# Patient Record
Sex: MALE | Race: AMERICAN INDIAN OR ALASKA NATIVE
[De-identification: names, ages, dates, MRNs, and addresses within clinical notes are randomized per-mention and may not be internally consistent; named-entity substitution may affect disease eponyms.]

---

## 2018-06-01 ENCOUNTER — HOSPITAL ENCOUNTER (INPATIENT)
Dept: HOSPITAL 31 - C.ER | Age: 38
LOS: 7 days | Discharge: HOME | DRG: 440 | End: 2018-06-08
Attending: INTERNAL MEDICINE | Admitting: INTERNAL MEDICINE
Payer: COMMERCIAL

## 2018-06-01 VITALS — BODY MASS INDEX: 33.5 KG/M2

## 2018-06-01 DIAGNOSIS — E87.6: ICD-10-CM

## 2018-06-01 DIAGNOSIS — E78.5: ICD-10-CM

## 2018-06-01 DIAGNOSIS — I10: ICD-10-CM

## 2018-06-01 DIAGNOSIS — K85.90: Primary | ICD-10-CM

## 2018-06-01 DIAGNOSIS — K70.10: ICD-10-CM

## 2018-06-01 DIAGNOSIS — K76.0: ICD-10-CM

## 2018-06-01 LAB
ALBUMIN SERPL-MCNC: 4.5 G/DL (ref 3.5–5)
ALBUMIN/GLOB SERPL: 1.1 {RATIO} (ref 1–2.1)
ALT SERPL-CCNC: 146 U/L (ref 21–72)
AMYLASE SERPL-CCNC: 436 U/L (ref 30–110)
AST SERPL-CCNC: 308 U/L (ref 17–59)
BACTERIA #/AREA URNS HPF: (no result) /[HPF]
BASOPHILS # BLD AUTO: 0 K/UL (ref 0–0.2)
BASOPHILS NFR BLD: 0.4 % (ref 0–2)
BILIRUB UR-MCNC: NEGATIVE MG/DL
BILIRUB UR-MCNC: NEGATIVE MG/DL
BUN SERPL-MCNC: 15 MG/DL (ref 9–20)
CALCIUM SERPL-MCNC: 9.2 MG/DL (ref 8.6–10.4)
COLOR UR: YELLOW
EOSINOPHIL # BLD AUTO: 0 K/UL (ref 0–0.7)
EOSINOPHIL NFR BLD: 0 % (ref 0–4)
ERYTHROCYTE [DISTWIDTH] IN BLOOD BY AUTOMATED COUNT: 13.2 % (ref 11.5–14.5)
GFR NON-AFRICAN AMERICAN: > 60
GLUCOSE UR STRIP-MCNC: NORMAL MG/DL
GLUCOSE UR STRIP-MCNC: NORMAL MG/DL
HGB BLD-MCNC: 16.1 G/DL (ref 12–18)
HYALINE CASTS #/AREA URNS LPF: (no result) /LPF (ref 0–2)
LEUKOCYTE ESTERASE UR-ACNC: (no result) LEU/UL
LEUKOCYTE ESTERASE UR-ACNC: (no result) LEU/UL
LIPASE: 3257 U/L (ref 23–300)
LYMPHOCYTES # BLD AUTO: 1.7 K/UL (ref 1–4.3)
LYMPHOCYTES NFR BLD AUTO: 14.8 % (ref 20–40)
MCH RBC QN AUTO: 31.4 PG (ref 27–31)
MCHC RBC AUTO-ENTMCNC: 35 G/DL (ref 33–37)
MCV RBC AUTO: 89.8 FL (ref 80–94)
MONOCYTES # BLD: 0.7 K/UL (ref 0–0.8)
MONOCYTES NFR BLD: 5.9 % (ref 0–10)
NEUTROPHILS # BLD: 8.8 K/UL (ref 1.8–7)
NEUTROPHILS NFR BLD AUTO: 78.9 % (ref 50–75)
NRBC BLD AUTO-RTO: 0 % (ref 0–2)
PH UR STRIP: 5 [PH] (ref 5–8)
PH UR STRIP: 5 [PH] (ref 5–8)
PLATELET # BLD: 269 K/UL (ref 130–400)
PMV BLD AUTO: 8.4 FL (ref 7.2–11.7)
PROT UR STRIP-MCNC: (no result) MG/DL
PROT UR STRIP-MCNC: (no result) MG/DL
RBC # BLD AUTO: 5.13 MIL/UL (ref 4.4–5.9)
RBC # UR STRIP: (no result) /UL
RBC # UR STRIP: (no result) /UL
SP GR UR STRIP: 1.03 (ref 1–1.03)
SP GR UR STRIP: 1.03 (ref 1–1.03)
SQUAMOUS EPITHIAL: 1 /HPF (ref 0–5)
SQUAMOUS EPITHIAL: 1 /HPF (ref 0–5)
UROBILINOGEN UR-MCNC: NORMAL MG/DL (ref 0.2–1)
UROBILINOGEN UR-MCNC: NORMAL MG/DL (ref 0.2–1)
WBC # BLD AUTO: 11.2 K/UL (ref 4.8–10.8)

## 2018-06-01 RX ADMIN — HYDROMORPHONE HYDROCHLORIDE PRN MG: 1 INJECTION, SOLUTION INTRAMUSCULAR; INTRAVENOUS; SUBCUTANEOUS at 17:18

## 2018-06-01 RX ADMIN — HYDROMORPHONE HYDROCHLORIDE PRN MG: 1 INJECTION, SOLUTION INTRAMUSCULAR; INTRAVENOUS; SUBCUTANEOUS at 23:54

## 2018-06-01 RX ADMIN — DEXTROSE AND SODIUM CHLORIDE SCH MLS/HR: 5; 450 INJECTION, SOLUTION INTRAVENOUS at 18:47

## 2018-06-01 RX ADMIN — DEXTROSE AND SODIUM CHLORIDE SCH MLS/HR: 5; 450 INJECTION, SOLUTION INTRAVENOUS at 21:28

## 2018-06-01 NOTE — CT
PROCEDURE:  CT Abdomen and Pelvis without intravenous contrast



HISTORY:

pancreatitis/abdominal pain



COMPARISON:

None.



TECHNIQUE:

Without contrast.. 



Contrast dose: 0



Radiation dose:



Total exam DLP = 777.27 mGy-cm.



This CT exam was performed using one or more of the following dose 

reduction techniques: Automated exposure control, adjustment of the 

mA and/or kV according to patient size, and/or use of iterative 

reconstruction technique.



FINDINGS:



LOWER THORAX:

Unremarkable. 



LIVER:

Hepatomegaly.  The liver measures approximately 20.5 cm craniocaudal. 

 Diffusely diminished attenuation of the liver with some 

heterogeneity consistent with heterogeneous fatty infiltration. No 

mass.  Smooth contour. No biliary ductal dilatation.  



GALLBLADDER AND BILE DUCTS:

Unremarkable. 



PANCREAS:

Extensive peripancreatic stranding about the body and tail of the 

pancreas without discrete fluid collection.  Consistent with acute 

pancreatitis.  No pancreatic mass or pancreatic ductal dilatation 

appreciated.



SPLEEN:

Normal size.  Punctate calcifications consistent with calcified old 

granulomas.  No mass. 



ADRENALS:

Unremarkable. No mass. 



KIDNEYS AND URETERS:

Unremarkable. No hydronephrosis. No solid mass. 



VASCULATURE:

Unremarkable. No aortic aneurysm. 



BOWEL:

Unremarkable. No obstruction. No gross mural thickening. 



APPENDIX:

Unremarkable. Normal appendix. 



PERITONEUM:

Fluid in left pericolic gutter as a result of acute pancreatitis. 



LYMPH NODES:

Unremarkable. No enlarged lymph nodes. 



BLADDER:

Unremarkable. 



REPRODUCTIVE:

Normal prostate 



BONES:

No acute fracture. 



OTHER FINDINGS:

None.



IMPRESSION:

Findings consistent with acute pancreatitis.  No discrete fluid 

collection. Hepatomegaly with fatty infiltration of the liver. No 

evidence of biliary obstruction or pancreatic ductal dilatation. No 

pancreatic mass identified on this noncontrast examination.

## 2018-06-01 NOTE — CP.PCM.CON
History of Present Illness





- History of Present Illness


History of Present Illness: 





This is a 38 year old man with abdominal pain.





Patient was previously admitted to  from 12/23/2014 to 12/29/2014 for 

pancreatitis attributed to alcohol ingestion.  Patient states that he continued 

to drink beer until last weekend when he consumed close to one pint of bourbon.

  He noted sudden onset of epigastric and LUQ pain on the night before 

admission.  The pain was described as sharp, needle-like, constant, and 

accompanied by nausea and vomiting of bilious material (eight to ten times).  

He has not had a bowel movement in the past two days, but he denies having 

diarrhea, constipation or rectal bleeding before this illness.  He also denies 

having loss of appetite, loss of weight, heartburn or difficulty swallowing.





On evaluation in the ER, elevated liver enzymes and lipase were noted.  The AST 

was 308, , ALKP 117 and lipase 3250.  CT scan showed fatty liver and 

extensive peripancreatic stranding; sonogram showed no evidence of 

cholelithiasis and normal sized bile ducts.





Review of Systems





- Review of Systems


All systems: reviewed and no additional remarkable complaints except





- Constitutional


Constitutional: absent: Chills, Fever





- Cardiovascular


Cardiovascular: absent: Chest Pain, Palpitations





- Respiratory


Respiratory: absent: Dyspnea





- Gastrointestinal


Gastrointestinal: Abdominal Pain, Constipation, Nausea, Vomiting.  absent: 

Diarrhea, Dysphagia, Heartburn, Hematochezia





- Musculoskeletal


Musculoskeletal: absent: Back Pain





- Neurological


Neurological: absent: Numbness, Weakness





Past Patient History





- Past Medical History & Family History


Past Medical History?: Yes





- Past Social History


Smoking Status: Never Smoked





- CARDIAC


Hx Hypercholesterolemia: Yes


Hx Hypertension: Yes (noncompliant with meds)





- MUSCULOSKELETAL/RHEUMATOLOGICAL


Hx Falls: No





- PSYCHIATRIC


Hx Substance Use: No





- SURGICAL HISTORY


Hx Surgeries: No





- ANESTHESIA


Hx Anesthesia: No


Hx Anesthesia Reactions: No


Hx Malignant Hyperthermia: No





Meds


Allergies/Adverse Reactions: 


 Allergies











Allergy/AdvReac Type Severity Reaction Status Date / Time


 


shellfish derived Allergy   Verified 06/01/18 10:52














- Medications


Medications: 


 Current Medications





Hydromorphone HCl (Dilaudid)  0.5 mg IVP Q6H PRN


   PRN Reason: Pain, severe (8-10)


   Last Admin: 06/01/18 17:18 Dose:  0.5 mg


Dextrose/Sodium Chloride (Dextrose 5%/0.45% Ns 1000 Ml)  1,000 mls @ 125 mls/hr 

IV .Q8H GABI


   Last Admin: 06/01/18 17:15 Dose:  125 mls/hr


Nitroglycerin (Nitro-Bid 2% Oint)  1 ea TOP ONCE ONE


   Stop: 06/02/18 18:08











Physical Exam





- Constitutional


Appears: In Acute Distress





- Head Exam


Head Exam: ATRAUMATIC, NORMOCEPHALIC





- Eye Exam


Eye Exam: EOMI, PERRL





- Neck Exam


Neck exam: Negative for: Lymphadenopathy, Thyromegaly





- Respiratory Exam


Respiratory Exam: NORMAL BREATHING PATTERN.  absent: Rales, Rhonchi, Wheezes





- Cardiovascular Exam


Cardiovascular Exam: REGULAR RHYTHM, +S1, +S2.  absent: Gallop, Rubs, Systolic 

Murmur





- GI/Abdominal Exam


GI & Abdominal Exam: Firm, Guarding, Normal Bowel Sounds, Tenderness.  absent: 

Mass, Organomegaly


Additional comments: 





Tenderness to palpation in the epigastric area





- Rectal Exam


Rectal Exam: Deferred





- Extremities Exam


Extremities exam: Negative for: calf tenderness, pedal edema





Results





- Vital Signs


Recent Vital Signs: 


 Last Vital Signs











Temp  98.8 F   06/01/18 10:54


 


Pulse  102 H  06/01/18 14:52


 


Resp  16   06/01/18 14:52


 


BP  138/102 H  06/01/18 14:52


 


Pulse Ox  99   06/01/18 14:52














- Labs


Result Diagrams: 


 06/01/18 11:45





 06/01/18 11:45


Labs: 


 Laboratory Results - last 24 hr











  06/01/18 06/01/18 06/01/18





  11:45 11:45 11:45


 


WBC  11.2 H D  


 


RBC  5.13  


 


Hgb  16.1  


 


Hct  46.0  


 


MCV  89.8  D  


 


MCH  31.4 H  


 


MCHC  35.0  


 


RDW  13.2  


 


Plt Count  269  


 


MPV  8.4  


 


Neut % (Auto)  78.9 H  


 


Lymph % (Auto)  14.8 L  


 


Mono % (Auto)  5.9  


 


Eos % (Auto)  0.0  


 


Baso % (Auto)  0.4  


 


Neut # (Auto)  8.8 H  


 


Lymph # (Auto)  1.7  


 


Mono # (Auto)  0.7  


 


Eos # (Auto)  0.0  


 


Baso # (Auto)  0.0  


 


Sodium    140


 


Potassium    4.1


 


Chloride    100


 


Carbon Dioxide    19 L


 


Anion Gap    25 H


 


BUN    15


 


Creatinine    1.1


 


Est GFR ( Amer)    > 60


 


Est GFR (Non-Af Amer)    > 60


 


Random Glucose    95


 


Calcium    9.2


 


Total Bilirubin    1.3


 


AST    308 H


 


ALT    146 H


 


Alkaline Phosphatase    117


 


Total Protein    8.5 H


 


Albumin    4.5


 


Globulin    4.0 H


 


Albumin/Globulin Ratio    1.1


 


Amylase    436 H


 


Lipase    3257 H


 


Urine Color   Klarissa 


 


Urine Clarity   Hazy 


 


Urine pH   5.0 


 


Ur Specific Gravity   1.027 


 


Urine Protein   2+ H 


 


Urine Glucose (UA)   Normal 


 


Urine Ketones   1+ H 


 


Urine Blood   2+ H 


 


Urine Nitrate   Negative 


 


Urine Bilirubin   Negative 


 


Urine Urobilinogen   Normal 


 


Ur Leukocyte Esterase   Neg 


 


Urine WBC (Auto)   2 


 


Urine RBC (Auto)   4 H 


 


Ur Squamous Epith Cells   1 


 


Urine Bacteria   


 


Hyaline Casts   6-10 H 














  06/01/18





  16:43


 


WBC 


 


RBC 


 


Hgb 


 


Hct 


 


MCV 


 


MCH 


 


MCHC 


 


RDW 


 


Plt Count 


 


MPV 


 


Neut % (Auto) 


 


Lymph % (Auto) 


 


Mono % (Auto) 


 


Eos % (Auto) 


 


Baso % (Auto) 


 


Neut # (Auto) 


 


Lymph # (Auto) 


 


Mono # (Auto) 


 


Eos # (Auto) 


 


Baso # (Auto) 


 


Sodium 


 


Potassium 


 


Chloride 


 


Carbon Dioxide 


 


Anion Gap 


 


BUN 


 


Creatinine 


 


Est GFR ( Amer) 


 


Est GFR (Non-Af Amer) 


 


Random Glucose 


 


Calcium 


 


Total Bilirubin 


 


AST 


 


ALT 


 


Alkaline Phosphatase 


 


Total Protein 


 


Albumin 


 


Globulin 


 


Albumin/Globulin Ratio 


 


Amylase 


 


Lipase 


 


Urine Color  Yellow


 


Urine Clarity  Hazy


 


Urine pH  5.0


 


Ur Specific Gravity  1.028


 


Urine Protein  2+ H


 


Urine Glucose (UA)  Normal


 


Urine Ketones  1+ H


 


Urine Blood  1+ H


 


Urine Nitrate  Negative


 


Urine Bilirubin  Negative


 


Urine Urobilinogen  Normal


 


Ur Leukocyte Esterase  Neg


 


Urine WBC (Auto)  3


 


Urine RBC (Auto)  12 H


 


Ur Squamous Epith Cells  1


 


Urine Bacteria  Few H


 


Hyaline Casts 














Assessment & Plan


(1) Alcoholic hepatitis


Assessment and Plan: 


Elevated transaminases with AST>ALT and imaging findings of fatty liver are 

consistent with alcoholic hepatitis.  Recommend abstinence from alcohol and 

referral to an alcohol treatment program.


Status: Acute   





(2) Acute pancreatitis


Assessment and Plan: 


Patient has acute pancreatitis most likely related to alcohol abuse.  In 

addition, there is evidence of significant hemoconcentration (HGB 16.1).  

Recommend vigorous hydration and NPO for now.


Status: Acute

## 2018-06-01 NOTE — US
HISTORY:

abdominal pain/vomiting



COMPARISON:

None.



TECHNIQUE:

Grayscale imaging was performed.



FINDINGS:



LIVER:

Measures 20.4 cm in length. There is diffuse increased echogenicity 

of the liver parenchyma.  No mass. No intrahepatic bile duct 

dilatation. 



GALLBLADDER:

There are no gallstones, wall thickening or pericholecystic fluid.  

The sonographic Altamirano's sign is negative. 



COMMON BILE DUCT:

Measures 2.3 mm.  No stones. No dilatation.



PANCREAS:

Unremarkable as visualized. No mass. No ductal dilatation.



RIGHT KIDNEY:

Measures 12.3 cm in length. Normal echogenicity. No calculus, mass, 

or hydronephrosis.



AORTA:

No aneurysmal dilatation.



IVC:

Unremarkable.



OTHER FINDINGS:

None .



IMPRESSION:

Mild hepatomegaly.  Diffuse increased echogenicity in the liver may 

reflect hepatic steatosis however parenchymal infectious/ 

inflammatory etiologies cannot be entirely excluded.  Clinical and 

laboratory correlation is advised.



No cholelithiasis or biliary dilatation.

## 2018-06-01 NOTE — C.PDOC
History Of Present Illness


38-year-old male, presents to the emergency department with complaints of 

epigastric and right upper quadrant abdominal pain that started last night. 

Patient notes nausea, that is associated with several episodes of non-bloody/non

-bilious vomiting. He denies any fevers, chills, diarrhea, or any other 

associated symptoms. No other complaints at this time.


Time Seen by Provider: 06/01/18 11:09


Chief Complaint (Nursing): Abdominal Pain


History Per: Patient


History/Exam Limitations: no limitations


Onset/Duration Of Symptoms: Hrs


Current Symptoms Are (Timing): Still Present


Severity: Moderate


Location Of Pain/Discomfort: RUQ, Epigastric


Radiation Of Pain To:: None


Associated Symptoms: Nausea, Vomiting





Past Medical History


Reviewed: Historical Data, Nursing Documentation, Vital Signs


Vital Signs: 


 Last Vital Signs











Temp  98.8 F   06/01/18 10:54


 


Pulse  107 H  06/01/18 13:31


 


Resp  16   06/01/18 13:31


 


BP  165/110 H  06/01/18 13:31


 


Pulse Ox  98   06/01/18 13:31














- Medical History


PMH: HTN (noncompliant with meds), Hypercholesterolemia





- [facility] Procedures








INFLUENZA VACCINATION (12/23/14)


VACCINATION NEC (12/23/14)








Family History: States: No Known Family Hx





- Social History


Hx Tobacco Use: No


Hx Alcohol Use: Yes


Hx Substance Use: No





- Immunization History


Hx Tetanus Toxoid Vaccination: Yes


Hx Influenza Vaccination: Yes


Hx Pneumococcal Vaccination: No





Review Of Systems


Constitutional: Negative for: Fever, Chills


Cardiovascular: Negative for: Chest Pain, Palpitations


Respiratory: Negative for: Shortness of Breath


Gastrointestinal: Positive for: Nausea, Vomiting, Abdominal Pain.  Negative for

: Diarrhea, Constipation, Hematochezia, Hematemesis


Musculoskeletal: Negative for: Back Pain


Neurological: Negative for: Weakness, Numbness





Physical Exam





- Physical Exam


Appears: Non-toxic, Other (moderate distress)


Skin: Normal Color, Warm, Dry, No Rash


Head: Atraumatic, Normacephalic


Eye(s): bilateral: Normal Inspection, PERRL


Nose: Normal


Oral Mucosa: Moist


Lips: Normal Appearing


Neck: Normal ROM


Chest: Symmetrical


Cardiovascular: Rhythm Regular, No Murmur


Respiratory: Normal Breath Sounds, No Accessory Muscle Use


Gastrointestinal/Abdominal: Soft, Tenderness (epigastric, RUQ (+)todd's sign)

, No Guarding, No Rebound


Extremity: Normal ROM, No Deformity, No Swelling


Neurological/Psych: Oriented x3, Normal Speech





ED Course And Treatment





- Laboratory Results


Result Diagrams: 


 06/01/18 11:45





 06/01/18 11:45


O2 Sat by Pulse Oximetry: 95 (RA)


Pulse Ox Interpretation: Normal


Progress Note: Case was d/w  who accepted patient to telemetry ( 

due to patient's condition).





Medical Decision Making


Medical Decision Making: 


Impression


abdominal pain





Plan:


* Bloodwork


* Protonix, IVFs, Toradol, Zofran


* US Abdomen


* UA


* Reassess and Disposition





Disposition





- Disposition


Disposition: HOSPITALIZED


Disposition Time: 14:26


Condition: FAIR


Forms:  [facility] (English)





- Clinical Impression


Clinical Impression: 


 Acute pancreatitis








- Scribe Statement


The provider has reviewed the documentation as recorded by the Scribe (Tabby Bowens)


All medical record entries made by the Scribe were at my direction and 

personally dictated by me. I have reviewed the chart and agree that the record 

accurately reflects my personal performance of the history, physical exam, 

medical decision making, and the department course for this patient. I have 

also personally directed, reviewed, and agree with the discharge instructions 

and disposition.





Decision To Admit





- Pt Status Changed To:


Hospital Disposition Of: Inpatient





- Admit Certification


Admit to Inpatient:: After my assessment, the patient will require 

hospitalization for at least two midnights.  This is because of the severity of 

symptoms shown, intensity of services needed, and/or the medical risk in this 

patient being treated as an outpatient.





- InPatient:


Physician Admission Certification:: Patient with acute pancreatitis needs to be 

NPO on IVF for more than 2 days.





- .


Bed Request Type: Telemetry


Admitting Physician: Zully Torres


Patient Diagnosis: 


 Acute pancreatitis

## 2018-06-01 NOTE — CP.PCM.PN
Subjective





- Date & Time of Evaluation


Date of Evaluation: 06/01/18


Time of Evaluation: 15:35





- Subjective


Subjective: 





H&P dictated #[id number]





Objective





- Vital Signs/Intake and Output


Vital Signs (last 24 hours): 


 











Temp Pulse Resp BP Pulse Ox


 


 98.8 F   102 H  16   138/102 H  99 


 


 06/01/18 10:54  06/01/18 14:52  06/01/18 14:52  06/01/18 14:52  06/01/18 14:52











- Medications


Medications: 


 Current Medications





Hydromorphone HCl (Dilaudid)  0.5 mg IVP Q6H PRN


   PRN Reason: Pain, severe (8-10)


Dextrose/Sodium Chloride (Dextrose 5%/0.45% Ns 1000 Ml)  1,000 mls @ 125 mls/hr 

IV .Q8H GABI


Thiamine HCl (Vitamin B1 Inj)  100 mg IV ONCE ONE


   Stop: 06/01/18 15:31











- Labs


Labs: 


 





 06/01/18 11:45 





 06/01/18 11:45

## 2018-06-02 LAB
ALBUMIN SERPL-MCNC: 3.6 G/DL (ref 3.5–5)
ALBUMIN/GLOB SERPL: 1.1 {RATIO} (ref 1–2.1)
ALT SERPL-CCNC: 103 U/L (ref 21–72)
AMYLASE SERPL-CCNC: 289 U/L (ref 30–110)
AST SERPL-CCNC: 195 U/L (ref 17–59)
BASOPHILS # BLD AUTO: 0 K/UL (ref 0–0.2)
BASOPHILS NFR BLD: 0.5 % (ref 0–2)
BUN SERPL-MCNC: 6 MG/DL (ref 9–20)
CALCIUM SERPL-MCNC: 8.6 MG/DL (ref 8.6–10.4)
EOSINOPHIL # BLD AUTO: 0.2 K/UL (ref 0–0.7)
EOSINOPHIL NFR BLD: 2.4 % (ref 0–4)
ERYTHROCYTE [DISTWIDTH] IN BLOOD BY AUTOMATED COUNT: 13.3 % (ref 11.5–14.5)
GFR NON-AFRICAN AMERICAN: > 60
HDLC SERPL-MCNC: 33 MG/DL (ref 30–70)
HGB BLD-MCNC: 13.7 G/DL (ref 12–18)
LDLC SERPL-MCNC: 53 MG/DL (ref 0–129)
LIPASE: 2787 U/L (ref 23–300)
LYMPHOCYTES # BLD AUTO: 1 K/UL (ref 1–4.3)
LYMPHOCYTES NFR BLD AUTO: 14.3 % (ref 20–40)
MCH RBC QN AUTO: 31.3 PG (ref 27–31)
MCHC RBC AUTO-ENTMCNC: 34.9 G/DL (ref 33–37)
MCV RBC AUTO: 89.8 FL (ref 80–94)
MONOCYTES # BLD: 0.7 K/UL (ref 0–0.8)
MONOCYTES NFR BLD: 9.5 % (ref 0–10)
NEUTROPHILS # BLD: 5.2 K/UL (ref 1.8–7)
NEUTROPHILS NFR BLD AUTO: 73.3 % (ref 50–75)
NRBC BLD AUTO-RTO: 0.1 % (ref 0–2)
PLATELET # BLD: 188 K/UL (ref 130–400)
PMV BLD AUTO: 8 FL (ref 7.2–11.7)
RBC # BLD AUTO: 4.36 MIL/UL (ref 4.4–5.9)
WBC # BLD AUTO: 7 K/UL (ref 4.8–10.8)

## 2018-06-02 RX ADMIN — HYDROMORPHONE HYDROCHLORIDE PRN MG: 1 INJECTION, SOLUTION INTRAMUSCULAR; INTRAVENOUS; SUBCUTANEOUS at 06:26

## 2018-06-02 RX ADMIN — DEXTROSE AND SODIUM CHLORIDE SCH MLS/HR: 5; 450 INJECTION, SOLUTION INTRAVENOUS at 08:48

## 2018-06-02 RX ADMIN — SODIUM CHLORIDE, SODIUM LACTATE, POTASSIUM CHLORIDE, CALCIUM CHLORIDE AND DEXTROSE MONOHYDRATE SCH MLS/HR: 5; 600; 310; 30; 20 INJECTION, SOLUTION INTRAVENOUS at 14:02

## 2018-06-02 RX ADMIN — HYDROMORPHONE HYDROCHLORIDE PRN MG: 1 INJECTION, SOLUTION INTRAMUSCULAR; INTRAVENOUS; SUBCUTANEOUS at 20:19

## 2018-06-02 RX ADMIN — SODIUM CHLORIDE, SODIUM LACTATE, POTASSIUM CHLORIDE, CALCIUM CHLORIDE AND DEXTROSE MONOHYDRATE SCH MLS/HR: 5; 600; 310; 30; 20 INJECTION, SOLUTION INTRAVENOUS at 20:27

## 2018-06-02 RX ADMIN — HYDROMORPHONE HYDROCHLORIDE PRN MG: 1 INJECTION, SOLUTION INTRAMUSCULAR; INTRAVENOUS; SUBCUTANEOUS at 12:51

## 2018-06-02 RX ADMIN — DEXTROSE AND SODIUM CHLORIDE SCH MLS/HR: 5; 450 INJECTION, SOLUTION INTRAVENOUS at 04:21

## 2018-06-02 RX ADMIN — DEXTROSE AND SODIUM CHLORIDE SCH MLS/HR: 5; 450 INJECTION, SOLUTION INTRAVENOUS at 00:41

## 2018-06-02 NOTE — HP
CHIEF COMPLAINT:  Epigastric and upper abdominal pain which started last

night, associated with nausea and vomiting.



HISTORY OF PRESENT ILLNESS:  Mr. Dixon is a 38-year-old male with past

medical history of hypertension for many years, hyperlipemia who has been

following up with Dr. Marsh as primary care physician who had prior

admission in 12/2014, was admitted for alcohol-related acute pancreatitis

who has been drinking over the long weekend, drank multiple shots over the

weekend, and now he started noticing epigastric pain and upper abdominal

pain, which started last night.  It was sharp but nonradiating, 10/10,

associated with nausea and multiple episodes of vomiting.  He may have had

a bowel movement yesterday morning as per the patient.  His symptoms got

worse this morning, which made him come to the emergency room.  When I

examined the patient, he denied any headache or dizziness.  Denied any

nausea or vomiting.  His abdominal pain is better than yesterday, but still

persistent and better with the pain medication that he got in the emergency

room.  Denied any nausea or vomiting.  Denied any urinary complaints. 

Denied any diarrhea or constipation.  Denied any other neurologic symptoms.



PAST MEDICAL HISTORY:  Hypertension for more than 15 years, hyperlipemia.



FAMILY HISTORY:  Hypertension, diabetes mellitus and CAD in mother.  Sister

has coronary artery disease.



PAST SURGICAL HISTORY:  Denies any past surgical history.



PERSONAL HISTORY:  He is single, not having any children, works at a mental

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



ALLERGIES:  HE IS ALLERGIC TO SHELLFISH.



MEDICATIONS:  Include lisinopril 10 mg daily and Lipitor.



SOCIAL HISTORY:  He denies smoking.  He claims that he drinks alcohol on

the weekends.  Last drink was this past weekend.  Denies any other drug

abuse.



REVIEW OF SYSTEMS:  As described in history of present illness.  All other

systems reviewed and were found to be negative.



PHYSICAL EXAMINATION:

GENERAL:  Young, well-built, well-nourished male, lying in bed, in no acute

distress.

VITAL SIGNS:  Blood pressure 162/104, pulse 104, respiration 20,

temperature 98.6 degrees Fahrenheit, O2 saturation 96% on room air.

HEENT:  Pupils equal, round, and reacting to light and accommodation. 

Extraocular muscles intact.  No icterus.  No pallor.  No oral thrush.  No

pharyngeal congestion.  Dry mucous membranes.

NECK:  Supple.  No JVD.

LUNGS:  Bilateral with clear breath sounds.  No wheezing.  No rhonchi.

CVS:  S1 and S2 present, regular.

ABDOMEN:  Soft.  Bowel sounds present.  There is epigastric tenderness

noted.  No guarding, no rigidity, no rebound tenderness noted.

CNS:  Alert, awake, and oriented x3.  No focal deficits noted.

EXTREMITIES:  No edema.  Palpable peripheral pulses.



LABORATORY DATA:  Labs done from the ED, WBC 11.2, hemoglobin 16.1,

hematocrit 46 and platelets 269.  Sodium 140, potassium 4.1, chloride 100,

bicarb 19, BUN 15, creatinine 1.1, glucose 95, calcium 9.2.  Total

bilirubin 1.3, , , alkaline phosphate 117, total protein 8.5,

albumin 4.5, amylase 436, lipase 3257.  UA:  Specific gravity 1.027, pH of

5, protein 2+, ketones 1+, blood 2+, rbc's 4, hyaline cast _____.  CT of

the abdomen and pelvis consistent with acute pancreatitis and fatty

infiltration of the liver.  No evidence of biliary obstruction or

pancreatic ductal dilatation.  No pancreatic mass identified.  Ultrasound

of the abdomen consistent with mild hepatomegaly.  No cholelithiasis or

biliary dilatation.



ASSESSMENT AND PLAN:  Young male with past medical history of hypertension,

hyperlipemia, history of ethanol abuse and history of pancreatitis in 2014,

came into the emergency department with complaints of upper abdominal pain

associated with nausea and vomiting.  In the emergency department, the

patient was found to be having elevated liver function tests and elevated

lipase and CTS consistent with acute pancreatitis.  The patient is being

admitted for further management.

1.  Acute pancreatitis, probably alcohol related pancreatitis as the

ultrasound is negative for any cholelithiasis or any biliary dilatation.

2.  Abnormal liver function tests, probably secondary to acute alcoholic

hepatitis.

3.  History of hypertension, now blood pressure is uncontrolled.

4.  History of hyperlipidemia.



PLAN:  The patient is being admitted to telemetry.  We will keep the

patient n.p.o.  We will give IV hydration.  Give pain medication.  Dilaudid

as needed.  Repeat labs in the morning.  Obtain GI evaluation.  Restart his

home medications.  Monitor liver function tests.  I gave thiamine 100 mg IV

one dose and switched to p.o. medication once he has been set.  Repeat labs

in the morning.  We will give hydralazine as needed for elevated blood

pressure.





__________________________________________

Zully Torres MD





DD:  06/01/2018 21:20:19

DT:  06/02/2018 0:10:25

[facility] # [id number]

## 2018-06-02 NOTE — PN
DATE:  06/02/2018



SUBJECTIVE:  The patient was seen and examined at bedside.  The patient is

slightly feeling better.  Pain is less severe than yesterday.  Feeling

nauseous from the pain medication.  Denies any episodes of vomiting

overnight.  No other events from overnight happened other than his blood

pressure running high, requiring multiple IV doses.



REVIEW OF SYSTEMS:  All other systems reviewed and was found to be

negative.



PHYSICAL EXAMINATION:

GENERAL:  Young male lying in bed, in no acute distress.

VITAL SIGNS:  Blood pressure 139/90, pulse 90, respirations 20, temperature

98.6 degrees Fahrenheit, O2 saturations 96% on room air.

HEENT:  Pupils equal, round, and reacting to light and accommodation. 

Extraocular muscles intact.  No icterus.  No pallor.  No oral thrush.  No

pharyngeal congestion.

NECK:  Supple.  No JVD.

LUNGS:  Bilateral vesicular breath sounds.  No wheezing, no rhonchi.

CVS:  S1 and S2 are present, regular.

ABDOMEN:  Epigastric tenderness present.  Soft.  Bowel sounds present.  No

guarding.  No rigidity.

CNS:  Alert, awake, and oriented x3.  No focal deficits noted.

EXTREMITIES:  No edema.  Positive peripheral pulses.



MEDICATIONS:  Include Dilaudid 0.5 mg IV every 6 hours p.r.n., hydralazine

10 mg IV push every 6 hours p.r.n., IV fluids, D5 Ringer's lactate at 150

mL/hour.



LABORATORY DATA:  Labs done from this morning:  WBC 7, hemoglobin 13.7,

hematocrit 39.2, platelets 188.  Sodium 137, potassium 3.4, chloride 99,

bicarb 28, BUN 6, creatinine 0.9, glucose 136, calcium 8.6.  , ALT

103, alkaline phosphatase 68, albumin 6.7, albumin 3.6, triglycerides 212,

cholesterol 128, LDL 53, amylase 289, lipase _____, TSH 4.14.



ASSESSMENT AND PLAN:  A young male with history of hypertension,

hyperlipidemia, history of ethanol abuse, and prior admission for acute

pancreatitis, alcohol related.  Admitted for acute pancreatitis, most

likely alcohol-related and acute alcoholic hepatitis.  Thick elevated liver

function tests.  Hypokalemia.  The patient liver function tests are

improving and also the patient's amylase and lipase are better than

yesterday.  We will continue with current pain medication. 

Gastroenterology consult and follow up as needed.  IV fluids decreased to

150 mL.  We will continue with hydration.



Repeat labs in the morning.  We will add further recommendations.





__________________________________________

Zully Torres MD







DD:  06/02/2018 12:58:00

DT:  06/02/2018 14:37:55

Job # [id number]

## 2018-06-02 NOTE — CP.PCM.PN
Subjective





- Date & Time of Evaluation


Date of Evaluation: 06/02/18


Time of Evaluation: 10:52





- Subjective


Subjective: 





Patient states that the pain is slightly less severe, though he still requires 

parenteral narcotics every six hours.  He has been nauseated but denies having 

vomiting.  He had a small, formed bowel movement.





Objective





- Vital Signs/Intake and Output


Vital Signs (last 24 hours): 


 











Temp Pulse Resp BP Pulse Ox


 


 98.6 F   90   20   139/90   96 


 


 06/02/18 07:46  06/02/18 07:46  06/02/18 07:46  06/02/18 07:46  06/02/18 07:46








Intake and Output: 


 











 06/02/18 06/02/18





 06:59 18:59


 


Intake Total 3550 


 


Output Total 200 


 


Balance 3350 














- Medications


Medications: 


 Current Medications





Hydralazine HCl (Apresoline)  10 mg IVP Q6H PRN


   PRN Reason: For BP>180


   Last Admin: 06/02/18 08:46 Dose:  10 mg


Hydromorphone HCl (Dilaudid)  0.5 mg IVP Q6H PRN


   PRN Reason: Pain, severe (8-10)


   Last Admin: 06/02/18 06:26 Dose:  0.5 mg


Dextrose/Lactated Ringer's (Dextrose 5%/Lactated Ringer's)  1,000 mls @ 150 mls/

hr IV .Q6H40M [facility]











- Labs


Labs: 


 





 06/02/18 06:36 





 06/02/18 06:36 











- Constitutional


Appears: No Acute Distress





- Head Exam


Head Exam: ATRAUMATIC, NORMOCEPHALIC





- Eye Exam


Eye Exam: EOMI, PERRL





- Neck Exam


Neck Exam: absent: Lymphadenopathy, Thyromegaly





- Respiratory Exam


Respiratory Exam: NORMAL BREATHING PATTERN.  absent: Rales, Rhonchi, Wheezes





- Cardiovascular Exam


Cardiovascular Exam: REGULAR RHYTHM, +S1, +S2.  absent: Gallop, Rubs, Murmur





- GI/Abdominal Exam


GI & Abdominal Exam: Soft, Tenderness, Normal Bowel Sounds.  absent: Mass, 

Organomegaly





- Rectal Exam


Rectal Exam: Deferred





- Extremities Exam


Extremities Exam: absent: Calf Tenderness, Pedal Edema





Assessment and Plan


(1) Alcoholic hepatitis


Assessment & Plan: 


Transaminases have improved to ,  (from 308 and 146, respectively)

.  Will follow.


Status: Acute   





(2) Acute pancreatitis


Assessment & Plan: 


Pain has improved slightly.  The state of hydration is better, with BUN/Cr down 

to 6/0.9 and HCT down to 39,2% from 46%.  Will reduce IV fluid to 150 cc per 

hour.


Status: Acute

## 2018-06-02 NOTE — CP.PCM.PN
Subjective





- Date & Time of Evaluation


Date of Evaluation: 06/02/18


Time of Evaluation: 08:35





- Subjective


Subjective: 





Progress note dictated #[id number]





Objective





- Vital Signs/Intake and Output


Vital Signs (last 24 hours): 


 











Temp Pulse Resp BP Pulse Ox


 


 98.6 F   90   20   139/90   96 


 


 06/02/18 07:46  06/02/18 07:46  06/02/18 07:46  06/02/18 07:46  06/02/18 07:46








Intake and Output: 


 











 06/02/18 06/02/18





 06:59 18:59


 


Intake Total 3550 


 


Output Total 200 


 


Balance 3350 














- Medications


Medications: 


 Current Medications





Hydralazine HCl (Apresoline)  10 mg IVP Q6H PRN


   PRN Reason: For BP>180


Hydromorphone HCl (Dilaudid)  0.5 mg IVP Q6H PRN


   PRN Reason: Pain, severe (8-10)


   Last Admin: 06/02/18 06:26 Dose:  0.5 mg


Dextrose/Sodium Chloride (Dextrose 5%/0.45% Ns 1000 Ml)  1,000 mls @ 300 mls/hr 

IV .Q3H20M [facility]


   Stop: 06/02/18 10:00


   Last Admin: 06/02/18 04:21 Dose:  300 mls/hr











- Labs


Labs: 


 





 06/02/18 06:36 





 06/02/18 06:36

## 2018-06-03 LAB
ALBUMIN SERPL-MCNC: 3.4 G/DL (ref 3.5–5)
ALBUMIN/GLOB SERPL: 1.1 {RATIO} (ref 1–2.1)
ALT SERPL-CCNC: 80 U/L (ref 21–72)
AMYLASE SERPL-CCNC: 159 U/L (ref 30–110)
AST SERPL-CCNC: 127 U/L (ref 17–59)
BASOPHILS # BLD AUTO: 0.1 K/UL (ref 0–0.2)
BASOPHILS NFR BLD: 0.7 % (ref 0–2)
BUN SERPL-MCNC: < 2 MG/DL (ref 9–20)
CALCIUM SERPL-MCNC: 8.8 MG/DL (ref 8.6–10.4)
EOSINOPHIL # BLD AUTO: 0.6 K/UL (ref 0–0.7)
EOSINOPHIL NFR BLD: 8.6 % (ref 0–4)
ERYTHROCYTE [DISTWIDTH] IN BLOOD BY AUTOMATED COUNT: 13.3 % (ref 11.5–14.5)
GFR NON-AFRICAN AMERICAN: > 60
HGB BLD-MCNC: 12.8 G/DL (ref 12–18)
LIPASE: 1487 U/L (ref 23–300)
LYMPHOCYTES # BLD AUTO: 1.3 K/UL (ref 1–4.3)
LYMPHOCYTES NFR BLD AUTO: 18.5 % (ref 20–40)
MCH RBC QN AUTO: 32 PG (ref 27–31)
MCHC RBC AUTO-ENTMCNC: 35.4 G/DL (ref 33–37)
MCV RBC AUTO: 90.3 FL (ref 80–94)
MONOCYTES # BLD: 0.7 K/UL (ref 0–0.8)
MONOCYTES NFR BLD: 10.1 % (ref 0–10)
NEUTROPHILS # BLD: 4.4 K/UL (ref 1.8–7)
NEUTROPHILS NFR BLD AUTO: 62.1 % (ref 50–75)
NRBC BLD AUTO-RTO: 0 % (ref 0–2)
PLATELET # BLD: 163 K/UL (ref 130–400)
PMV BLD AUTO: 8.8 FL (ref 7.2–11.7)
RBC # BLD AUTO: 4.01 MIL/UL (ref 4.4–5.9)
WBC # BLD AUTO: 7.1 K/UL (ref 4.8–10.8)

## 2018-06-03 RX ADMIN — SODIUM CHLORIDE, SODIUM LACTATE, POTASSIUM CHLORIDE, CALCIUM CHLORIDE AND DEXTROSE MONOHYDRATE SCH: 5; 600; 310; 30; 20 INJECTION, SOLUTION INTRAVENOUS at 00:20

## 2018-06-03 RX ADMIN — SODIUM CHLORIDE, SODIUM LACTATE, POTASSIUM CHLORIDE, CALCIUM CHLORIDE AND DEXTROSE MONOHYDRATE SCH MLS/HR: 5; 600; 310; 30; 20 INJECTION, SOLUTION INTRAVENOUS at 19:08

## 2018-06-03 RX ADMIN — HYDROMORPHONE HYDROCHLORIDE PRN MG: 1 INJECTION, SOLUTION INTRAMUSCULAR; INTRAVENOUS; SUBCUTANEOUS at 02:16

## 2018-06-03 RX ADMIN — SODIUM CHLORIDE, SODIUM LACTATE, POTASSIUM CHLORIDE, CALCIUM CHLORIDE AND DEXTROSE MONOHYDRATE SCH MLS/HR: 5; 600; 310; 30; 20 INJECTION, SOLUTION INTRAVENOUS at 14:28

## 2018-06-03 RX ADMIN — SODIUM CHLORIDE, SODIUM LACTATE, POTASSIUM CHLORIDE, CALCIUM CHLORIDE AND DEXTROSE MONOHYDRATE SCH MLS/HR: 5; 600; 310; 30; 20 INJECTION, SOLUTION INTRAVENOUS at 01:39

## 2018-06-03 RX ADMIN — HYDROMORPHONE HYDROCHLORIDE PRN MG: 1 INJECTION, SOLUTION INTRAMUSCULAR; INTRAVENOUS; SUBCUTANEOUS at 20:30

## 2018-06-03 RX ADMIN — HYDROMORPHONE HYDROCHLORIDE PRN MG: 1 INJECTION, SOLUTION INTRAMUSCULAR; INTRAVENOUS; SUBCUTANEOUS at 14:23

## 2018-06-03 RX ADMIN — HYDROMORPHONE HYDROCHLORIDE PRN MG: 1 INJECTION, SOLUTION INTRAMUSCULAR; INTRAVENOUS; SUBCUTANEOUS at 08:43

## 2018-06-03 NOTE — CP.PCM.PN
Subjective





- Date & Time of Evaluation


Date of Evaluation: 06/03/18


Time of Evaluation: 12:50





- Subjective


Subjective: 





Progress note dictated #[id number]





Objective





- Vital Signs/Intake and Output


Vital Signs (last 24 hours): 


 











Temp Pulse Resp BP Pulse Ox


 


 98.7 F   86   20   147/99 H  95 


 


 06/03/18 07:00  06/03/18 07:00  06/03/18 07:00  06/03/18 07:00  06/03/18 07:00








Intake and Output: 


 











 06/03/18 06/03/18





 06:59 18:59


 


Intake Total 1200 


 


Output Total 1500 


 


Balance -300 














- Medications


Medications: 


 Current Medications





Hydralazine HCl (Apresoline)  10 mg IVP Q6H PRN


   PRN Reason: For BP>180


   Last Admin: 06/02/18 16:07 Dose:  10 mg


Hydromorphone HCl (Dilaudid)  0.5 mg IVP Q6H PRN


   PRN Reason: Pain, severe (8-10)


   Last Admin: 06/03/18 08:43 Dose:  0.5 mg


Dextrose/Lactated Ringer's (Dextrose 5%/Lactated Ringer's)  1,000 mls @ 150 mls/

hr IV .Q6H40M [facility]


   Last Admin: 06/03/18 01:39 Dose:  150 mls/hr


Lisinopril (Zestril)  5 mg PO DAILY [facility]


   Last Admin: 06/03/18 11:00 Dose:  5 mg











- Labs


Labs: 


 





 06/03/18 07:28 





 06/03/18 07:28

## 2018-06-03 NOTE — CP.PCM.PN
Subjective





- Date & Time of Evaluation


Date of Evaluation: 06/03/18


Time of Evaluation: 09:14





- Subjective


Subjective: 





Patient states that the pain is essentially unchanged.  He denies having nausea 

or vomiting, and has not had a bowel movment this morning.





Objective





- Vital Signs/Intake and Output


Vital Signs (last 24 hours): 


 











Temp Pulse Resp BP Pulse Ox


 


 98.7 F   86   20   147/99 H  95 


 


 06/03/18 07:00  06/03/18 07:00  06/03/18 07:00  06/03/18 07:00  06/03/18 07:00








Intake and Output: 


 











 06/03/18 06/03/18





 06:59 18:59


 


Intake Total 1200 


 


Output Total 1500 


 


Balance -300 














- Medications


Medications: 


 Current Medications





Hydralazine HCl (Apresoline)  10 mg IVP Q6H PRN


   PRN Reason: For BP>180


   Last Admin: 06/02/18 16:07 Dose:  10 mg


Hydromorphone HCl (Dilaudid)  0.5 mg IVP Q6H PRN


   PRN Reason: Pain, severe (8-10)


   Last Admin: 06/03/18 08:43 Dose:  0.5 mg


Dextrose/Lactated Ringer's (Dextrose 5%/Lactated Ringer's)  1,000 mls @ 150 mls/

hr IV .Q6H40M [facility]


   Last Admin: 06/03/18 01:39 Dose:  150 mls/hr











- Labs


Labs: 


 





 06/03/18 07:28 





 06/03/18 07:28 











- Constitutional


Appears: No Acute Distress





- Head Exam


Head Exam: ATRAUMATIC, NORMOCEPHALIC





- Eye Exam


Eye Exam: EOMI, PERRL





- Neck Exam


Neck Exam: absent: Lymphadenopathy





- Respiratory Exam


Respiratory Exam: NORMAL BREATHING PATTERN.  absent: Rales, Rhonchi, Wheezes





- Cardiovascular Exam


Cardiovascular Exam: REGULAR RHYTHM, +S1, +S2.  absent: Gallop, Rubs, Murmur





- GI/Abdominal Exam


GI & Abdominal Exam: Soft, Normal Bowel Sounds.  absent: Tenderness, Mass, 

Organomegaly





- Rectal Exam


Rectal Exam: Deferred





- Extremities Exam


Extremities Exam: absent: Calf Tenderness, Pedal Edema





Assessment and Plan


(1) Alcoholic hepatitis


Assessment & Plan: 


Liver enzymes continue to improve, with   and ALT 80.  Will follow LFTs.


Status: Acute   





(2) Acute pancreatitis


Assessment & Plan: 


Pain is unchanged.  Hemoconcentration has responded to hydration, and the HCT 

today is 36.2% and BUN/Cr <2/0.8.  Will start clear liquids.


Status: Acute

## 2018-06-03 NOTE — PN
DATE:  06/03/2018



SUBJECTIVE:  The patient was seen and examined at bedside.  The patient

still complains of epigastric pain, but slightly better than yesterday,

able to tolerate liquid diet.  Denies any nausea, vomiting.  Claims that he

was not able to sleep last night.  Denies any other new complaints on

examination.



PHYSICAL EXAMINATION:

GENERAL:  Young male lying in bed, in no acute distress.

VITAL SIGNS:  Blood pressure 147/99, pulse 86, respirations 20, temperature

98.7 degrees Fahrenheit, O2 sat is 95% on room air.

HEENT:  Pupils equal, round, and reacting to light and accommodation. 

Extraocular muscles intact.  No icterus.  No pallor.  No oral thrush.  No

pharyngeal congestion.

NECK:  Supple.  No JVD.

LUNGS:  Bilateral vesicular breath sounds.  No wheezing, no rhonchi.

CVS:  S1 and S2 present, regular.

ABDOMEN:  Soft.  Bowel sounds present.  Mild epigastric tenderness noted. 

No guarding.  No rigidity.  No rebound tenderness noted

CNS:  Alert, awake, and oriented x3.  No focal deficits noted.

EXTREMITIES:  No edema.  Palpable peripheral pulses.



MEDICATIONS:  Include lisinopril 5 mg daily, Dilaudid as needed every 6

hours, hydralazine 10 mg IV push every 6 hours p.r.n., Ringer's lactate at

150 mL/hour.



LABORATORY DATA:  Labs from this morning:  WBC 7.1, hemoglobin 12.8,

hematocrit 36.2, platelets 163.  Sodium 138, potassium 3, chloride 98,

bicarb 30, BUN 2, creatinine 0.8, glucose 104, calcium 8.8, phosphorus 2.5,

magnesium 1.8, total bili 1.3, , ALT 80, alkaline phosphatase 70,

albumin 3.4, triglycerides 212, cholesterol 128, LDL 53, HDL 33, lipase is

1487, amylase is 159.



ASSESSMENT AND PLAN:  Young male with history of hypertension and

hyperlipidemia, admitted for acute pancreatitis, alcohol-related and acute

alcoholic hepatitis with elevated liver function tests with amylase, lipase

and _____ are improving.  The patient is tolerating clear liquid diet. 

Restart his p.o. blood pressure medication.  Adjust medications as needed. 

Continue the pain medication, give extra doses of pain medication if

necessary.  Gastroenterology input  appreciated.  We will advance diet as

tolerated.  Repeat labs in the morning.







__________________________________________

Zully Torres MD



DD:  06/03/2018 13:28:01

DT:  06/03/2018 14:58:22

[facility] # [id number]

## 2018-06-04 LAB
ALBUMIN SERPL-MCNC: 3.6 G/DL (ref 3.5–5)
ALBUMIN/GLOB SERPL: 1 {RATIO} (ref 1–2.1)
ALT SERPL-CCNC: 78 U/L (ref 21–72)
AMYLASE SERPL-CCNC: 166 U/L (ref 30–110)
AST SERPL-CCNC: 90 U/L (ref 17–59)
BASOPHILS # BLD AUTO: 0.1 K/UL (ref 0–0.2)
BASOPHILS NFR BLD: 0.7 % (ref 0–2)
BUN SERPL-MCNC: 3 MG/DL (ref 9–20)
CALCIUM SERPL-MCNC: 9.3 MG/DL (ref 8.6–10.4)
EOSINOPHIL # BLD AUTO: 0.3 K/UL (ref 0–0.7)
EOSINOPHIL NFR BLD: 3.6 % (ref 0–4)
ERYTHROCYTE [DISTWIDTH] IN BLOOD BY AUTOMATED COUNT: 13.4 % (ref 11.5–14.5)
GFR NON-AFRICAN AMERICAN: > 60
HGB BLD-MCNC: 12.9 G/DL (ref 12–18)
LIPASE: 1221 U/L (ref 23–300)
LYMPHOCYTES # BLD AUTO: 1.4 K/UL (ref 1–4.3)
LYMPHOCYTES NFR BLD AUTO: 16.9 % (ref 20–40)
MCH RBC QN AUTO: 32.1 PG (ref 27–31)
MCHC RBC AUTO-ENTMCNC: 35.4 G/DL (ref 33–37)
MCV RBC AUTO: 90.6 FL (ref 80–94)
MONOCYTES # BLD: 0.9 K/UL (ref 0–0.8)
MONOCYTES NFR BLD: 10.8 % (ref 0–10)
NEUTROPHILS # BLD: 5.6 K/UL (ref 1.8–7)
NEUTROPHILS NFR BLD AUTO: 68 % (ref 50–75)
NRBC BLD AUTO-RTO: 0 % (ref 0–2)
PLATELET # BLD: 180 K/UL (ref 130–400)
PMV BLD AUTO: 9.3 FL (ref 7.2–11.7)
RBC # BLD AUTO: 4.03 MIL/UL (ref 4.4–5.9)
WBC # BLD AUTO: 8.2 K/UL (ref 4.8–10.8)

## 2018-06-04 RX ADMIN — SODIUM CHLORIDE, SODIUM LACTATE, POTASSIUM CHLORIDE, CALCIUM CHLORIDE AND DEXTROSE MONOHYDRATE SCH MLS/HR: 5; 600; 310; 30; 20 INJECTION, SOLUTION INTRAVENOUS at 02:17

## 2018-06-04 RX ADMIN — HYDROMORPHONE HYDROCHLORIDE PRN MG: 1 INJECTION, SOLUTION INTRAMUSCULAR; INTRAVENOUS; SUBCUTANEOUS at 14:55

## 2018-06-04 RX ADMIN — SODIUM CHLORIDE, SODIUM LACTATE, POTASSIUM CHLORIDE, CALCIUM CHLORIDE AND DEXTROSE MONOHYDRATE SCH: 5; 600; 310; 30; 20 INJECTION, SOLUTION INTRAVENOUS at 06:44

## 2018-06-04 RX ADMIN — SODIUM CHLORIDE, SODIUM LACTATE, POTASSIUM CHLORIDE, CALCIUM CHLORIDE AND DEXTROSE MONOHYDRATE SCH MLS/HR: 5; 600; 310; 30; 20 INJECTION, SOLUTION INTRAVENOUS at 11:05

## 2018-06-04 RX ADMIN — SODIUM CHLORIDE, SODIUM LACTATE, POTASSIUM CHLORIDE, CALCIUM CHLORIDE AND DEXTROSE MONOHYDRATE SCH MLS/HR: 5; 600; 310; 30; 20 INJECTION, SOLUTION INTRAVENOUS at 23:11

## 2018-06-04 RX ADMIN — SODIUM CHLORIDE, SODIUM LACTATE, POTASSIUM CHLORIDE, CALCIUM CHLORIDE AND DEXTROSE MONOHYDRATE SCH MLS/HR: 5; 600; 310; 30; 20 INJECTION, SOLUTION INTRAVENOUS at 17:20

## 2018-06-04 RX ADMIN — HYDROMORPHONE HYDROCHLORIDE PRN MG: 1 INJECTION, SOLUTION INTRAMUSCULAR; INTRAVENOUS; SUBCUTANEOUS at 02:18

## 2018-06-04 RX ADMIN — HYDROMORPHONE HYDROCHLORIDE PRN MG: 1 INJECTION, SOLUTION INTRAMUSCULAR; INTRAVENOUS; SUBCUTANEOUS at 08:41

## 2018-06-04 RX ADMIN — SODIUM CHLORIDE, SODIUM LACTATE, POTASSIUM CHLORIDE, CALCIUM CHLORIDE AND DEXTROSE MONOHYDRATE SCH: 5; 600; 310; 30; 20 INJECTION, SOLUTION INTRAVENOUS at 09:40

## 2018-06-04 NOTE — CP.PCM.PN
Subjective





- Date & Time of Evaluation


Date of Evaluation: 06/04/18


Time of Evaluation: 10:35





- Subjective


Subjective: 





Patient states that the pain is essentially unchanged.  He still requests pain 

injections around the clock.  He vomited once yesterday after taking a  

medication.  He had a small, soft bowel movement yesterday as well.








Objective





- Vital Signs/Intake and Output


Vital Signs (last 24 hours): 


 











Temp Pulse Resp BP Pulse Ox


 


 98.0 F   79   20   163/81 H  97 


 


 06/04/18 08:59  06/04/18 08:59  06/04/18 08:59  06/04/18 08:59  06/04/18 08:59








Intake and Output: 


 











 06/04/18 06/04/18





 06:59 18:59


 


Intake Total 1200 


 


Output Total 1050 


 


Balance 150 














- Medications


Medications: 


 Current Medications





Hydralazine HCl (Apresoline)  10 mg IVP Q6H PRN


   PRN Reason: For BP>180


   Last Admin: 06/04/18 00:14 Dose:  10 mg


Hydromorphone HCl (Dilaudid)  0.5 mg IVP Q6H PRN


   PRN Reason: Pain, severe (8-10)


   Last Admin: 06/04/18 08:41 Dose:  0.5 mg


Dextrose/Lactated Ringer's (Dextrose 5%/Lactated Ringer's)  1,000 mls @ 150 mls/

hr IV .Q6H40M [facility]


   Last Admin: 06/04/18 06:44 Dose:  Not Given


Lisinopril (Zestril)  5 mg PO DAILY [facility]


   Last Admin: 06/04/18 09:09 Dose:  5 mg











- Labs


Labs: 


 





 06/04/18 06:48 





 06/04/18 06:48 











- Constitutional


Appears: No Acute Distress





- Head Exam


Head Exam: ATRAUMATIC, NORMOCEPHALIC





- Eye Exam


Eye Exam: EOMI, PERRL





- Neck Exam


Neck Exam: absent: Lymphadenopathy, Thyromegaly





- Respiratory Exam


Respiratory Exam: NORMAL BREATHING PATTERN.  absent: Rales, Rhonchi, Wheezes





- Cardiovascular Exam


Cardiovascular Exam: Clicks, +S1, +S2.  absent: Gallop, Rubs, Murmur





- GI/Abdominal Exam


GI & Abdominal Exam: Soft, Tenderness, Normal Bowel Sounds.  absent: Mass, 

Organomegaly


Additional comments: 





Mild epigastric tenderness to palpation.





- Rectal Exam


Rectal Exam: Deferred





- Extremities Exam


Extremities Exam: absent: Calf Tenderness, Pedal Edema





Assessment and Plan


(1) Alcoholic hepatitis


Assessment & Plan: 


AST and ALT down to 90 and 78, respectively.  Will follow.


Status: Acute   





(2) Acute pancreatitis


Assessment & Plan: 


Patient continues to experience pain requiring parenteral narcotics.  Will 

continue clear liquids for now.


Status: Acute

## 2018-06-04 NOTE — CP.PCM.PN
Subjective





- Date & Time of Evaluation


Date of Evaluation: 06/04/18


Time of Evaluation: 11:15





- Subjective


Subjective: 





Progress note dictated #[id number]





Objective





- Vital Signs/Intake and Output


Vital Signs (last 24 hours): 


 











Temp Pulse Resp BP Pulse Ox


 


 98.0 F   86   20   156/95 H  97 


 


 06/04/18 08:59  06/04/18 09:09  06/04/18 08:59  06/04/18 09:09  06/04/18 08:59








Intake and Output: 


 











 06/04/18 06/04/18





 06:59 18:59


 


Intake Total 1200 


 


Output Total 1050 


 


Balance 150 














- Medications


Medications: 


 Current Medications





Hydralazine HCl (Apresoline)  10 mg IVP Q6H PRN


   PRN Reason: For BP>180


   Last Admin: 06/04/18 00:14 Dose:  10 mg


Hydromorphone HCl (Dilaudid)  0.5 mg IVP Q6H PRN


   PRN Reason: Pain, severe (8-10)


   Last Admin: 06/04/18 08:41 Dose:  0.5 mg


Dextrose/Lactated Ringer's (Dextrose 5%/Lactated Ringer's)  1,000 mls @ 150 mls/

hr IV .Q6H40M [facility]


   Last Admin: 06/04/18 11:05 Dose:  150 mls/hr


Potassium Chloride (Potassium Chloride 10 Meq/100 Ml)  10 meq in 100 mls @ 100 

mls/hr IVPB Q1H [facility]


   Stop: 06/04/18 15:14


Lisinopril (Zestril)  5 mg PO DAILY [facility]


   Last Admin: 06/04/18 09:09 Dose:  5 mg











- Labs


Labs: 


 





 06/04/18 06:48 





 06/04/18 06:48

## 2018-06-04 NOTE — PN
DATE:  06/04/2018



SUBJECTIVE:  The patient was seen and examined at bedside.  The patient is

feeling slightly better, still complaining of persistent epigastric pain,

requiring pain medication.  Had an episode of nausea and vomiting

yesterday, but this morning able to tolerate clear liquids and does not

want to be advanced to a _____ diet.   Denies any other new complaints.



PHYSICAL EXAMINATION:

GENERAL:  A young male, lying in bed, in no acute distress.

VITAL SIGNS:  Blood pressure 156/95, pulse 86, respirations 20, temperature

98 degrees Fahrenheit, O2 saturations 98% on room air.

HEENT:  Pupils equal, round, and reacting to light and accommodation. 

Extraocular muscles intact.  No icterus.  No pallor.  No oral thrush.  No

pharyngeal congestion.

NECK:  Supple.  No JVD.  No thyromegaly.

CHEST:  Moving equally bilaterally on respirations.

LUNGS:  Bilateral vesicular breath sounds.  No wheezing.  No rhonchi.

CVS:  S1 and S2 present, regular.

ABDOMEN:  Soft.  Bowel sounds present.  Epigastric tenderness present.  No

guarding.  No rigidity.

CNS:  Alert, awake, and oriented x3.  No focal deficits noted.

EXTREMITIES:  No edema.  Palpable peripheral pulses.



MEDICATIONS:  Include D5, Ringer's lactate advanced 50 mL/hour, hydralazine

10 mg IV push every 6 hours p.r.n., Dilaudid 0.5 mg IV push every 6 hours

p.r.n., Zestril 5 mg p.o. daily.



LABORATORY DATA:  Labs from this morning:  WBC 8.2, hemoglobin 12.9,

hematocrit 36.4, platelets 180.  Sodium 137, potassium 3.1, chloride 96,

bicarb 30, BUN 3, creatinine 0.8, glucose 123, calcium 9.3, phosphorus 3.7,

magnesium 1.6.  Total bilirubin 1.3, AST 90, ALT 78, alkaline phosphatase

74, total protein 7.1, albumin 3.6, lipase is 1221, amylase is 166.



ASSESSMENT AND PLAN:  A young male with history of hypertension,

hyperlipidemia, history of pancreatitis.  Admitted for acute pancreatitis

possibly alcohol related and acute alcoholic hepatitis.  All his laboratory

parameters are improving and persistent hypokalemia despite supplementation

yesterday.  Magnesium is low this morning.  We will supplement 1 gm of

magnesium and 40 mEq of KCl.  Repeat labs in the morning.  The patient does

not want dietary advance at this time.  We will continue with increased

lisinopril to 5 mg p.o. b.i.d. that first dose now.  We will supplement

potassium and magnesium.  Repeat labs in a.m.  Continue with other current

medications.





__________________________________________

Zully Torres MD





DD:  06/04/2018 18:50:28

DT:  06/04/2018 20:39:44

[facility] # [id number]

## 2018-06-05 LAB
ALBUMIN SERPL-MCNC: 3.9 G/DL (ref 3.5–5)
ALBUMIN/GLOB SERPL: 1.1 {RATIO} (ref 1–2.1)
ALT SERPL-CCNC: 70 U/L (ref 21–72)
AMYLASE SERPL-CCNC: 170 U/L (ref 30–110)
AST SERPL-CCNC: 74 U/L (ref 17–59)
BASOPHILS # BLD AUTO: 0.1 K/UL (ref 0–0.2)
BASOPHILS NFR BLD: 0.7 % (ref 0–2)
BUN SERPL-MCNC: 4 MG/DL (ref 9–20)
CALCIUM SERPL-MCNC: 9.4 MG/DL (ref 8.6–10.4)
EOSINOPHIL # BLD AUTO: 0.4 K/UL (ref 0–0.7)
EOSINOPHIL NFR BLD: 4.7 % (ref 0–4)
ERYTHROCYTE [DISTWIDTH] IN BLOOD BY AUTOMATED COUNT: 13 % (ref 11.5–14.5)
GFR NON-AFRICAN AMERICAN: > 60
HGB BLD-MCNC: 13.4 G/DL (ref 12–18)
LIPASE: 1327 U/L (ref 23–300)
LYMPHOCYTES # BLD AUTO: 1.5 K/UL (ref 1–4.3)
LYMPHOCYTES NFR BLD AUTO: 18.2 % (ref 20–40)
MCH RBC QN AUTO: 32.1 PG (ref 27–31)
MCHC RBC AUTO-ENTMCNC: 35.2 G/DL (ref 33–37)
MCV RBC AUTO: 91.2 FL (ref 80–94)
MONOCYTES # BLD: 1 K/UL (ref 0–0.8)
MONOCYTES NFR BLD: 11.6 % (ref 0–10)
NEUTROPHILS # BLD: 5.3 K/UL (ref 1.8–7)
NEUTROPHILS NFR BLD AUTO: 64.8 % (ref 50–75)
NRBC BLD AUTO-RTO: 0 % (ref 0–2)
PLATELET # BLD: 219 K/UL (ref 130–400)
PMV BLD AUTO: 8.7 FL (ref 7.2–11.7)
RBC # BLD AUTO: 4.17 MIL/UL (ref 4.4–5.9)
WBC # BLD AUTO: 8.2 K/UL (ref 4.8–10.8)

## 2018-06-05 RX ADMIN — SODIUM CHLORIDE, SODIUM LACTATE, POTASSIUM CHLORIDE, CALCIUM CHLORIDE AND DEXTROSE MONOHYDRATE SCH MLS/HR: 5; 600; 310; 30; 20 INJECTION, SOLUTION INTRAVENOUS at 03:11

## 2018-06-05 RX ADMIN — SODIUM CHLORIDE, SODIUM LACTATE, POTASSIUM CHLORIDE, CALCIUM CHLORIDE AND DEXTROSE MONOHYDRATE SCH: 5; 600; 310; 30; 20 INJECTION, SOLUTION INTRAVENOUS at 15:48

## 2018-06-05 RX ADMIN — HYDROMORPHONE HYDROCHLORIDE PRN MG: 1 INJECTION, SOLUTION INTRAMUSCULAR; INTRAVENOUS; SUBCUTANEOUS at 03:04

## 2018-06-05 RX ADMIN — HYDROMORPHONE HYDROCHLORIDE PRN MG: 1 INJECTION, SOLUTION INTRAMUSCULAR; INTRAVENOUS; SUBCUTANEOUS at 13:24

## 2018-06-05 RX ADMIN — SODIUM CHLORIDE, SODIUM LACTATE, POTASSIUM CHLORIDE, CALCIUM CHLORIDE AND DEXTROSE MONOHYDRATE SCH MLS/HR: 5; 600; 310; 30; 20 INJECTION, SOLUTION INTRAVENOUS at 23:46

## 2018-06-05 RX ADMIN — HYDROMORPHONE HYDROCHLORIDE PRN MG: 1 INJECTION, SOLUTION INTRAMUSCULAR; INTRAVENOUS; SUBCUTANEOUS at 19:30

## 2018-06-05 NOTE — CP.PCM.PN
Subjective





- Date & Time of Evaluation


Date of Evaluation: 06/05/18


Time of Evaluation: 17:12





- Subjective


Subjective: 





Patient states that the pain has improved somewhat; he is able to go nine or 

ten hours between injections.  He is tolerating the liquid diet and denies 

having nausea and vomiting.  He has not had a bowel movement today.








Objective





- Vital Signs/Intake and Output


Vital Signs (last 24 hours): 


 











Temp Pulse Resp BP Pulse Ox


 


 98.4 F   68   20   152/97 H  98 


 


 06/05/18 15:00  06/05/18 16:09  06/05/18 15:00  06/05/18 15:00  06/05/18 15:00








Intake and Output: 


 











 06/05/18 06/05/18





 06:59 18:59


 


Intake Total 2330 760


 


Output Total 1850 


 


Balance 480 760














- Medications


Medications: 


 Current Medications





Hydralazine HCl (Apresoline)  10 mg IVP Q6H PRN


   PRN Reason: For BP>180


   Last Admin: 06/04/18 19:07 Dose:  10 mg


Hydromorphone HCl (Dilaudid)  0.5 mg IVP Q6H PRN


   PRN Reason: Pain, severe (8-10)


   Last Admin: 06/05/18 13:24 Dose:  0.5 mg


Dextrose/Lactated Ringer's (Dextrose 5%/Lactated Ringer's)  1,000 mls @ 60 mls/

hr IV .[id number] [facility]


   Last Admin: 06/05/18 15:48 Dose:  Not Given


Lisinopril (Zestril)  10 mg PO BID [facility]


   Last Admin: 06/05/18 13:09 Dose:  Not Given











- Labs


Labs: 


 





 06/05/18 07:10 





 06/05/18 07:10 











- Constitutional


Appears: No Acute Distress





- Head Exam


Head Exam: ATRAUMATIC, NORMOCEPHALIC





- Eye Exam


Eye Exam: EOMI, PERRL





- Neck Exam


Neck Exam: absent: Lymphadenopathy, Thyromegaly





- Respiratory Exam


Respiratory Exam: NORMAL BREATHING PATTERN.  absent: Rales, Rhonchi, Wheezes





- Cardiovascular Exam


Cardiovascular Exam: REGULAR RHYTHM, +S1, +S2.  absent: Gallop, Rubs, Murmur





- GI/Abdominal Exam


GI & Abdominal Exam: Soft, Normal Bowel Sounds.  absent: Tenderness, Mass, 

Organomegaly





- Rectal Exam


Rectal Exam: Deferred





- Extremities Exam


Extremities Exam: absent: Calf Tenderness, Pedal Edema





Assessment and Plan


(1) Alcoholic hepatitis


Assessment & Plan: 


Liver enzymes continue to improve, now AST 74 and ALT 70.


Status: Acute   





(2) Acute pancreatitis


Assessment & Plan: 


Patient states that the pain is improving.  He was able to tolerate the liquid 

diet.  Will advance to low fat.


Status: Acute

## 2018-06-05 NOTE — CP.PCM.PN
Subjective





- Date & Time of Evaluation


Date of Evaluation: 06/05/18


Time of Evaluation: 10:00





- Subjective


Subjective: 





Progress note dictated #[id number]





Objective





- Vital Signs/Intake and Output


Vital Signs (last 24 hours): 


 











Temp Pulse Resp BP Pulse Ox


 


 98.5 F   80   18   159/102 H  98 


 


 06/05/18 07:20  06/05/18 07:20  06/05/18 07:20  06/05/18 07:20  06/05/18 07:20








Intake and Output: 


 











 06/05/18 06/05/18





 06:59 18:59


 


Intake Total 2330 


 


Output Total 1850 


 


Balance 480 














- Medications


Medications: 


 Current Medications





Hydralazine HCl (Apresoline)  10 mg IVP Q6H PRN


   PRN Reason: For BP>180


   Last Admin: 06/04/18 19:07 Dose:  10 mg


Hydromorphone HCl (Dilaudid)  0.5 mg IVP Q6H PRN


   PRN Reason: Pain, severe (8-10)


   Last Admin: 06/05/18 03:04 Dose:  0.5 mg


Dextrose/Lactated Ringer's (Dextrose 5%/Lactated Ringer's)  1,000 mls @ 60 mls/

hr IV .[id number] [facility]


   Last Admin: 06/05/18 03:11 Dose:  60 mls/hr


Lisinopril (Zestril)  5 mg PO BID [facility]


   Last Admin: 06/05/18 09:45 Dose:  5 mg











- Labs


Labs: 


 





 06/05/18 07:10 





 06/05/18 07:10

## 2018-06-06 RX ADMIN — SODIUM CHLORIDE, SODIUM LACTATE, POTASSIUM CHLORIDE, CALCIUM CHLORIDE AND DEXTROSE MONOHYDRATE SCH MLS/HR: 5; 600; 310; 30; 20 INJECTION, SOLUTION INTRAVENOUS at 16:52

## 2018-06-06 RX ADMIN — HYDROMORPHONE HYDROCHLORIDE PRN MG: 1 INJECTION, SOLUTION INTRAMUSCULAR; INTRAVENOUS; SUBCUTANEOUS at 01:25

## 2018-06-06 RX ADMIN — HYDROMORPHONE HYDROCHLORIDE PRN MG: 1 INJECTION, SOLUTION INTRAMUSCULAR; INTRAVENOUS; SUBCUTANEOUS at 16:50

## 2018-06-06 RX ADMIN — HYDROMORPHONE HYDROCHLORIDE PRN MG: 1 INJECTION, SOLUTION INTRAMUSCULAR; INTRAVENOUS; SUBCUTANEOUS at 08:34

## 2018-06-06 NOTE — PN
DATE:  06/05/2018



SUBJECTIVE:  The patient was seen and examined at bedside.  The patient is

feeling much better.  Pain is much less, tolerating liquids.  Denies any

other complaints.



PHYSICAL EXAMINATION:

GENERAL:  Young male, lying in bed, in no acute distress.

VITAL SIGNS:  Blood pressure 152/97, pulse 85, respirations 20, temperature

98.4 degrees Fahrenheit, O2 saturation 98% on room air.

HEENT:  Pupils are equal, round, and reacting to light and accommodation. 

Extraocular muscles are intact.  No icterus.  No pallor.  No oral thrush. 

No pharyngeal congestion.

NECK:  Supple.  No JVD.

LUNGS:  Bilateral vesicular breath sounds.  No wheezing.  No rhonchi.

CVS:  S1, S2 present, regular.

ABDOMEN:  Bowel sounds present.  No guarding.  No rigidity.  Mild

tenderness present in the epigastric region.  Soft.

CNS:  Alert, awake, and oriented x3.  No focal deficits noted.

EXTREMITIES:  No edema.  Palpable peripheral pulses.



MEDICATIONS:  Dilaudid 0.5 mg IV push every 6 hours p.r.n., Zestril 10 mg

p.o. b.i.d., hydralazine 10 mg IV push every 6 hours p.r.n.



LABORATORY DATA:  Labs from this morning:  WBC 8.2, hemoglobin 13.4,

hematocrit 38, and platelets 219.  Sodium 139, potassium 3.4, chloride 97,

bicarbonate 30, BUN 4, creatinine 0.8, glucose 115, calcium 9.4, magnesium

1.9.  AST 74, ALT 70, alkaline phosphatase 75, total protein 7.5, albumin

3.9, amylase 170, lipase 1327.  KAMERON is negative.



ASSESSMENT AND PLAN:  Young male with hypertension, hyperlipidemia,

admitted for acute pancreatitis, acute alcoholic hepatitis, hypokalemia. 

His liver function tests and amylase and lipase are improving.  Tolerating

liquid diet.  Advance diet as tolerated as per GI.  We will continue with

pain medication.  Increase lisinopril to 10 mg p.o. b.i.d.  Supplement

potassium.  Repeat labs in a.m.





__________________________________________

Zully Torres MD





DD:  06/05/2018 19:12:43

DT:  06/05/2018 20:15:47

[facility] # [id number]

## 2018-06-06 NOTE — CP.PCM.PN
Subjective





- Date & Time of Evaluation


Date of Evaluation: 06/06/18


Time of Evaluation: 16:37





- Subjective


Subjective: 





Patient states that the pain seems worse after meals.  He denies having nausea 

and vomiting.  He has not had a bowel movement today.








Objective





- Vital Signs/Intake and Output


Vital Signs (last 24 hours): 


 











Temp Pulse Resp BP Pulse Ox


 


 98.7 F   83   18   142/80   98 


 


 06/06/18 08:12  06/06/18 08:12  06/06/18 08:12  06/06/18 08:12  06/06/18 08:12








Intake and Output: 


 











 06/06/18 06/06/18





 06:59 18:59


 


Intake Total 480 760


 


Balance 480 760














- Medications


Medications: 


 Current Medications





Hydralazine HCl (Apresoline)  10 mg IVP Q6H PRN


   PRN Reason: For BP>180


   Last Admin: 06/04/18 19:07 Dose:  10 mg


Hydromorphone HCl (Dilaudid)  0.5 mg IVP Q6H PRN


   PRN Reason: Pain, severe (8-10)


   Last Admin: 06/06/18 08:34 Dose:  0.5 mg


Dextrose/Lactated Ringer's (Dextrose 5%/Lactated Ringer's)  1,000 mls @ 60 mls/

hr IV .[id number] [facility]


   Last Admin: 06/05/18 23:46 Dose:  60 mls/hr


Lisinopril (Zestril)  10 mg PO BID [facility]


   Last Admin: 06/06/18 10:23 Dose:  10 mg











- Labs


Labs: 


 





 06/05/18 07:10 





 06/05/18 07:10 











- Constitutional


Appears: No Acute Distress





- Head Exam


Head Exam: ATRAUMATIC, NORMOCEPHALIC





- Eye Exam


Eye Exam: EOMI, PERRL





- Neck Exam


Neck Exam: absent: Lymphadenopathy, Thyromegaly





- Respiratory Exam


Respiratory Exam: NORMAL BREATHING PATTERN.  absent: Rales, Rhonchi, Wheezes





- Cardiovascular Exam


Cardiovascular Exam: REGULAR RHYTHM, +S1, +S2.  absent: Gallop, Rubs, Murmur





- GI/Abdominal Exam


GI & Abdominal Exam: Soft, Tenderness, Normal Bowel Sounds.  absent: Mass, 

Organomegaly


Additional comments: 





Mild, diffuse tenderness to palpation.





- Rectal Exam


Rectal Exam: Deferred





- Extremities Exam


Extremities Exam: absent: Calf Tenderness, Pedal Edema





Assessment and Plan


(1) Alcoholic hepatitis


Status: Suspected   





(2) Acute pancreatitis


Assessment & Plan: 


Patient is experiencing increased pain with eating.  Will repeat CT scan to 

rule out pancreatic necrosis.


Status: Acute

## 2018-06-06 NOTE — CP.PCM.PN
Subjective





- Date & Time of Evaluation


Date of Evaluation: 06/06/18


Time of Evaluation: 11:10





- Subjective


Subjective: 





Progress note dictated #[id number]





Objective





- Vital Signs/Intake and Output


Vital Signs (last 24 hours): 


 











Temp Pulse Resp BP Pulse Ox


 


 98.7 F   83   18   142/80   98 


 


 06/06/18 08:12  06/06/18 08:12  06/06/18 08:12  06/06/18 08:12  06/06/18 08:12








Intake and Output: 


 











 06/06/18 06/06/18





 06:59 18:59


 


Intake Total 480 


 


Balance 480 














- Medications


Medications: 


 Current Medications





Hydralazine HCl (Apresoline)  10 mg IVP Q6H PRN


   PRN Reason: For BP>180


   Last Admin: 06/04/18 19:07 Dose:  10 mg


Hydromorphone HCl (Dilaudid)  0.5 mg IVP Q6H PRN


   PRN Reason: Pain, severe (8-10)


   Last Admin: 06/06/18 08:34 Dose:  0.5 mg


Dextrose/Lactated Ringer's (Dextrose 5%/Lactated Ringer's)  1,000 mls @ 60 mls/

hr IV .[id number] [facility]


   Last Admin: 06/05/18 23:46 Dose:  60 mls/hr


Lisinopril (Zestril)  10 mg PO BID [facility]


   Last Admin: 06/06/18 10:23 Dose:  10 mg











- Labs


Labs: 


 





 06/05/18 07:10 





 06/05/18 07:10

## 2018-06-07 VITALS — RESPIRATION RATE: 20 BRPM

## 2018-06-07 LAB
ALBUMIN SERPL-MCNC: 3.9 G/DL (ref 3.5–5)
ALBUMIN/GLOB SERPL: 1.1 {RATIO} (ref 1–2.1)
ALT SERPL-CCNC: 70 U/L (ref 21–72)
AMYLASE SERPL-CCNC: 145 U/L (ref 30–110)
AST SERPL-CCNC: 66 U/L (ref 17–59)
BASOPHILS # BLD AUTO: 0.1 K/UL (ref 0–0.2)
BASOPHILS NFR BLD: 0.9 % (ref 0–2)
BUN SERPL-MCNC: 9 MG/DL (ref 9–20)
CALCIUM SERPL-MCNC: 9.2 MG/DL (ref 8.6–10.4)
EOSINOPHIL # BLD AUTO: 0.3 K/UL (ref 0–0.7)
EOSINOPHIL NFR BLD: 4.6 % (ref 0–4)
ERYTHROCYTE [DISTWIDTH] IN BLOOD BY AUTOMATED COUNT: 12.9 % (ref 11.5–14.5)
GFR NON-AFRICAN AMERICAN: > 60
HGB BLD-MCNC: 12.3 G/DL (ref 12–18)
LIPASE: 1112 U/L (ref 23–300)
LYMPHOCYTES # BLD AUTO: 1.5 K/UL (ref 1–4.3)
LYMPHOCYTES NFR BLD AUTO: 20.3 % (ref 20–40)
MCH RBC QN AUTO: 32.1 PG (ref 27–31)
MCHC RBC AUTO-ENTMCNC: 35.3 G/DL (ref 33–37)
MCV RBC AUTO: 90.9 FL (ref 80–94)
MONOCYTES # BLD: 0.9 K/UL (ref 0–0.8)
MONOCYTES NFR BLD: 12.7 % (ref 0–10)
NEUTROPHILS # BLD: 4.4 K/UL (ref 1.8–7)
NEUTROPHILS NFR BLD AUTO: 61.5 % (ref 50–75)
NRBC BLD AUTO-RTO: 0 % (ref 0–2)
PLATELET # BLD: 219 K/UL (ref 130–400)
PMV BLD AUTO: 8.4 FL (ref 7.2–11.7)
RBC # BLD AUTO: 3.83 MIL/UL (ref 4.4–5.9)
WBC # BLD AUTO: 7.2 K/UL (ref 4.8–10.8)

## 2018-06-07 RX ADMIN — HYDROMORPHONE HYDROCHLORIDE PRN MG: 1 INJECTION, SOLUTION INTRAMUSCULAR; INTRAVENOUS; SUBCUTANEOUS at 06:43

## 2018-06-07 RX ADMIN — HYDROMORPHONE HYDROCHLORIDE PRN MG: 1 INJECTION, SOLUTION INTRAMUSCULAR; INTRAVENOUS; SUBCUTANEOUS at 13:33

## 2018-06-07 RX ADMIN — SODIUM CHLORIDE, SODIUM LACTATE, POTASSIUM CHLORIDE, CALCIUM CHLORIDE AND DEXTROSE MONOHYDRATE SCH: 5; 600; 310; 30; 20 INJECTION, SOLUTION INTRAVENOUS at 17:45

## 2018-06-07 RX ADMIN — SODIUM CHLORIDE, SODIUM LACTATE, POTASSIUM CHLORIDE, CALCIUM CHLORIDE AND DEXTROSE MONOHYDRATE SCH MLS/HR: 5; 600; 310; 30; 20 INJECTION, SOLUTION INTRAVENOUS at 09:33

## 2018-06-07 RX ADMIN — SODIUM CHLORIDE, SODIUM LACTATE, POTASSIUM CHLORIDE, CALCIUM CHLORIDE AND DEXTROSE MONOHYDRATE SCH: 5; 600; 310; 30; 20 INJECTION, SOLUTION INTRAVENOUS at 01:10

## 2018-06-07 RX ADMIN — HYDROMORPHONE HYDROCHLORIDE PRN MG: 1 INJECTION, SOLUTION INTRAMUSCULAR; INTRAVENOUS; SUBCUTANEOUS at 20:02

## 2018-06-07 RX ADMIN — HYDROMORPHONE HYDROCHLORIDE PRN MG: 1 INJECTION, SOLUTION INTRAMUSCULAR; INTRAVENOUS; SUBCUTANEOUS at 00:00

## 2018-06-07 NOTE — CT
PROCEDURE:  CT abdomen pelvis dated 06/07/2018 cyst at its 



HISTORY:

Acute pancreatitis, persistent pain



COMPARISON:

N study 06/01/2018.



TECHNIQUE:

Contiguous axial images of the abdomen performed without oral or 

intravenous contrast material.  Additional 2D sagittal and coronal 

reformats generated. 



Radiation dose:



Total exam DLP = 424.8 mGy-cm.



This CT exam was performed using one or more of the following dose 

reduction techniques: Automated exposure control, adjustment of the 

mA and/or kV according to patient size, and/or use of iterative 

reconstruction technique.



FINDINGS:



LOWER THORAX:

Trace left-sided effusion with some minimal left basilar atelectasis. 

 Lung fields are otherwise clear. 



Heart size normal. No significant pericardial effusion. 



There is a small hiatal hernia. 



LIVER:

The liver is upper limits of normal/ borderline enlarged.  Mild fatty 

hepatic infiltration. . .  No obvious hepatic mass or collection seen 

on this noncontrast study. 



GALLBLADDER AND BILE DUCTS:

The gallbladder is physiologically distended. No evidence of 

intraluminal gallbladder calculi. 



PANCREAS:

Persistent but interval improvement previously noted inflammatory 

changes of pancreatitis.



SPLEEN:

Tiny splenic calcification anterior aspect of the splenic parenchyma 

consistent with calcified granuloma. 



ADRENALS:

No adrenal lesions. 



KIDNEYS AND URETERS:

Unremarkable. No stone or hydronephrosis. 



BLADDER:

Not visualized



REPRODUCTIVE:

Unremarkable. 



APPENDIX:

Unremarkable.



BOWEL:

Questionable mild wall thickening of the transverse colon on prior 

study improved. 



PERITONEUM:

Unremarkable. No fluid collection. No free air. .  Fat containing 

umbilical hernia. 



LYMPH NODES:

Unremarkable. No enlarged lymph nodes. 



VASCULATURE:

Unremarkable. No aortic aneurysm. 



BONES:

.  The visualized osseous structures intact.  Minor degenerative 

changes of the lower thoracic and lumbar spine. 



OTHER FINDINGS:

None. 



IMPRESSION:

Persistent but interval improvement of previously noted inflammatory 

changes of pancreatitis.



Borderline/mild hepatomegaly with fatty hepatic infiltration.

## 2018-06-07 NOTE — PN
DATE:  06/06/2018



SUBJECTIVE:  The patient was seen and examined at bedside.  The patient is

much better.  Pain is much less but taking pain medication every 6 hours,

tolerating low fat diet.  Denies any other new complaints.



PHYSICAL EXAMINATION:

GENERAL:  A young male, lying in bed, in no acute distress.

VITAL SIGNS:  Blood pressure 137/86, pulse 80, respirations 20, temperature

98.9 degrees Fahrenheit, O2 saturation 96% on room air.

HEENT:  Pupils equal, round, reacting to light and accommodation. 

Extraocular muscles intact.  No icterus.  No pallor.  No oral, thrush, no

pharyngeal congestion.

NECK:  Supple.  No JVD.  No thyromegaly.

CHEST:  Moving equally bilaterally on respiration.

LUNGS:  Bilateral vesicular breath sounds.  No wheezing, no rhonchi.

CVS:  S1 and S2 present.  Regular.

ABDOMEN:  Soft, mild epigastric tenderness noted.  Bowel sounds present. 

No guarding, no rigidity.  No rebound tenderness noted.

CNS:  Alert, awake, oriented x3.  No focal deficits noted.

EXTREMITIES:  No edema.  Palpable peripheral pulses.



MEDICATIONS:  Include D5 Ringer's lactate at 60 mL/hour, hydralazine 10 mg

IV push every 6 hours p.r.n., Dilaudid 0.5 mg IV push every 6 hours p.r.n.,

Zestril 10 mg p.o. b.i.d.



LABORATORY DATA:  IgG pending.  KAMERON negative.



ASSESSMENT AND PLAN:  A young male with hypertension, hyperlipidemia,

admitted for acute pancreatitis, alcoholic hepatitis, blood pressure is

controlled on current medication.  We will continue with gentle hydration. 

Repeat labs in the morning.  The patient is evaluated by GI for repeat CT

scan to rule out any necrosis or fluid collection.  We will continue with

current pain medication.







__________________________________________

Zully Torres MD





DD:  06/06/2018 20:46:58

DT:  06/06/2018 20:57:18

Job # [id number]

## 2018-06-07 NOTE — CP.PCM.PN
Subjective





- Date & Time of Evaluation


Date of Evaluation: 06/07/18


Time of Evaluation: 10:45





- Subjective


Subjective: 





Progress note dictated #[id number]





Objective





- Vital Signs/Intake and Output


Vital Signs (last 24 hours): 


 











Temp Pulse Resp BP Pulse Ox


 


 99.4 F   92 H  18   143/100 H  97 


 


 06/07/18 07:30  06/07/18 07:30  06/07/18 07:30  06/07/18 07:30  06/07/18 07:30








Intake and Output: 


 











 06/07/18 06/07/18





 06:59 18:59


 


Intake Total 1680 


 


Output Total 1800 


 


Balance -120 














- Medications


Medications: 


 Current Medications





Hydralazine HCl (Apresoline)  10 mg IVP Q6H PRN


   PRN Reason: For BP>180


   Last Admin: 06/04/18 19:07 Dose:  10 mg


Hydromorphone HCl (Dilaudid)  0.5 mg IVP Q6H PRN


   PRN Reason: Pain, severe (8-10)


   Last Admin: 06/07/18 06:43 Dose:  0.5 mg


Dextrose/Lactated Ringer's (Dextrose 5%/Lactated Ringer's)  1,000 mls @ 60 mls/

hr IV .[id number] [facility]


   Last Admin: 06/07/18 09:33 Dose:  60 mls/hr


Potassium Chloride (Potassium Chloride 10 Meq/100 Ml)  10 meq in 100 mls @ 100 

mls/hr IVPB Q2 [facility]


   Stop: 06/07/18 14:59


   Last Admin: 06/07/18 09:33 Dose:  100 mls/hr


Lisinopril (Zestril)  10 mg PO BID [facility]


   Last Admin: 06/07/18 09:55 Dose:  Not Given











- Labs


Labs: 


 





 06/07/18 07:16 





 06/07/18 07:16

## 2018-06-07 NOTE — CP.PCM.PN
Subjective





- Date & Time of Evaluation


Date of Evaluation: 06/07/18


Time of Evaluation: 14:25





- Subjective


Subjective: 





Patient states that the pain seems thehsame as yesterday, stilll worse after 

meals.  He denies having nausea and vomiting.  He has had three loose bowel 

movements so far today.








Objective





- Vital Signs/Intake and Output


Vital Signs (last 24 hours): 


 











Temp Pulse Resp BP Pulse Ox


 


 99.4 F   86   18   150/95 H  97 


 


 06/07/18 07:30  06/07/18 13:33  06/07/18 07:30  06/07/18 13:33  06/07/18 07:30








Intake and Output: 


 











 06/07/18 06/07/18





 06:59 18:59


 


Intake Total 1680 


 


Output Total 1800 


 


Balance -120 














- Medications


Medications: 


 Current Medications





Hydralazine HCl (Apresoline)  10 mg IVP Q6H PRN


   PRN Reason: For BP>180


   Last Admin: 06/04/18 19:07 Dose:  10 mg


Hydromorphone HCl (Dilaudid)  0.5 mg IVP Q6H PRN


   PRN Reason: Pain, severe (8-10)


   Last Admin: 06/07/18 13:33 Dose:  0.5 mg


Dextrose/Lactated Ringer's (Dextrose 5%/Lactated Ringer's)  1,000 mls @ 60 mls/

hr IV .[id number] [facility]


   Last Admin: 06/07/18 09:33 Dose:  60 mls/hr


Potassium Chloride (Potassium Chloride 10 Meq/100 Ml)  10 meq in 100 mls @ 100 

mls/hr IVPB Q2 [facility]


   Stop: 06/07/18 14:59


   Last Admin: 06/07/18 11:40 Dose:  100 mls/hr


Lisinopril (Zestril)  10 mg PO BID [facility]


   Last Admin: 06/07/18 09:55 Dose:  Not Given











- Labs


Labs: 


 





 06/07/18 07:16 





 06/07/18 07:16 











- Constitutional


Appears: No Acute Distress





- Head Exam


Head Exam: ATRAUMATIC, NORMOCEPHALIC





- Eye Exam


Eye Exam: EOMI, PERRL





- Neck Exam


Neck Exam: absent: Lymphadenopathy, Thyromegaly





- Respiratory Exam


Respiratory Exam: NORMAL BREATHING PATTERN.  absent: Rales, Rhonchi, Wheezes





- Cardiovascular Exam


Cardiovascular Exam: REGULAR RHYTHM, +S1, +S2.  absent: Gallop, Rubs, Murmur





- GI/Abdominal Exam


GI & Abdominal Exam: Soft, Tenderness, Normal Bowel Sounds.  absent: Mass, 

Organomegaly


Additional comments: 





Diffuse mild tenderness to palpation





- Rectal Exam


Rectal Exam: Deferred





- Extremities Exam


Extremities Exam: absent: Calf Tenderness, Pedal Edema





Assessment and Plan


(1) Alcoholic hepatitis


Status: Suspected   





(2) Acute pancreatitis


Assessment & Plan: 


Patient states that the pain is unchanged.  The CT scan (without IV contrast 

owing to shellfish allergy) showed no increase in the mahi-pancreatic exudate.  

Recommend oral pain medication.


Status: Acute

## 2018-06-08 VITALS — DIASTOLIC BLOOD PRESSURE: 83 MMHG | SYSTOLIC BLOOD PRESSURE: 129 MMHG | TEMPERATURE: 97.2 F

## 2018-06-08 VITALS — OXYGEN SATURATION: 100 %

## 2018-06-08 VITALS — HEART RATE: 87 BPM

## 2018-06-08 LAB
ALBUMIN SERPL-MCNC: 4 G/DL (ref 3.5–5)
ALBUMIN/GLOB SERPL: 1.1 {RATIO} (ref 1–2.1)
ALT SERPL-CCNC: 64 U/L (ref 21–72)
AMYLASE SERPL-CCNC: 134 U/L (ref 30–110)
AST SERPL-CCNC: 48 U/L (ref 17–59)
BUN SERPL-MCNC: 9 MG/DL (ref 9–20)
CALCIUM SERPL-MCNC: 9.7 MG/DL (ref 8.6–10.4)
GFR NON-AFRICAN AMERICAN: > 60
LIPASE: 834 U/L (ref 23–300)

## 2018-06-08 RX ADMIN — SODIUM CHLORIDE, SODIUM LACTATE, POTASSIUM CHLORIDE, CALCIUM CHLORIDE AND DEXTROSE MONOHYDRATE SCH: 5; 600; 310; 30; 20 INJECTION, SOLUTION INTRAVENOUS at 10:30

## 2018-06-08 RX ADMIN — OXYCODONE HYDROCHLORIDE AND ACETAMINOPHEN PRN TAB: 5; 325 TABLET ORAL at 02:08

## 2018-06-08 RX ADMIN — OXYCODONE HYDROCHLORIDE AND ACETAMINOPHEN PRN TAB: 5; 325 TABLET ORAL at 10:00

## 2018-06-08 RX ADMIN — SODIUM CHLORIDE, SODIUM LACTATE, POTASSIUM CHLORIDE, CALCIUM CHLORIDE AND DEXTROSE MONOHYDRATE SCH MLS/HR: 5; 600; 310; 30; 20 INJECTION, SOLUTION INTRAVENOUS at 02:10

## 2018-06-08 NOTE — CP.PCM.PN
Subjective





- Date & Time of Evaluation


Date of Evaluation: 06/08/18


Time of Evaluation: 10:10





- Subjective


Subjective: 





Discharge summary dictated #[id number]





Objective





- Vital Signs/Intake and Output


Vital Signs (last 24 hours): 


 











Temp Pulse Resp BP Pulse Ox


 


 98.2 F   87   20   142/90   100 


 


 06/08/18 07:10  06/08/18 09:54  06/08/18 07:10  06/08/18 09:54  06/08/18 07:10








Intake and Output: 


 











 06/08/18 06/08/18





 06:59 18:59


 


Intake Total 1380 


 


Output Total 300 


 


Balance 1080 














- Medications


Medications: 


 Current Medications





Amlodipine Besylate (Norvasc)  5 mg PO DAILY [facility]


   Last Admin: 06/08/18 09:56 Dose:  5 mg


Dextrose/Lactated Ringer's (Dextrose 5%/Lactated Ringer's)  1,000 mls @ 60 mls/

hr IV .[id number] [facility]


   Last Admin: 06/08/18 02:10 Dose:  60 mls/hr


Lisinopril (Zestril)  10 mg PO BID [facility]


   Last Admin: 06/08/18 09:56 Dose:  10 mg


Oxycodone/Acetaminophen (Percocet 5/325 Mg Tab)  1 tab PO Q6H PRN


   PRN Reason: Pain, severe (8-10)


   Stop: 06/10/18 21:45


   Last Admin: 06/08/18 10:00 Dose:  1 tab











- Labs


Labs: 


 





 06/07/18 07:16 





 06/08/18 07:47

## 2018-06-08 NOTE — PN
DATE:  06/07/2018



SUBJECTIVE:  The patient was seen and examined at bedside.  The patient is

still complaining of pain in epigastric region.  The pain is more after

eating.  Denies any other new complaints.



PHYSICAL EXAMINATION:

GENERAL:  Young male, lying in bed, in no acute distress.

VITAL SIGNS:  Blood pressure 143/100, pulse 92, respirations 20,

temperature 99.4 degrees Fahrenheit, O2 sat is 97% on room air.

HEENT:  Pupils equal, round and reacting to light and accommodation. 

Extraocular muscles intact.  No icterus.  No pallor.  No oral thrush.  No

pharyngeal congestion.

NECK:  Supple.  No JVD.

LUNGS:  Bilateral vesicular breath sounds.  No wheezing.  No rhonchi.

CVS:  S1, S2 present.  Regular.

ABDOMEN:  Soft.  Bowel sounds are present.  Epigastric tenderness noted. 

No guarding.  No rigidity.  No rebound tenderness noted.

CNS:  Alert, awake and oriented x3.  No focal deficits noted.

EXTREMITIES:  No edema.  Palpable peripheral pulses.



MEDICATIONS:  Ringer's lactate 60 mL an hour, hydralazine 10 mg IV push

every 6 p.r.n., Dilaudid 0.5 mg IV push every 6 hours p.r.n., and Zestril

10 mg p.o. b.i.d.



LABORATORY DATA:  Labs from this morning; WBC 7.2, hemoglobin 12.3,

hematocrit 34.8 and platelets 219.  Sodium 138, potassium 3.2, chloride 98,

bicarbonate 27, BUN 9, creatinine 0.9, glucose 111, calcium 9.2, AST 66,

ALT 70, alkaline phosphatase 69, amylase 145 and lipase 1112.  Repeat CT

scan done this morning persistent but interval improvement of previously

noted inflammatory changes of pancreatitis, borderline hepatomegaly with

fatty hepatic infiltration.



ASSESSMENT AND PLAN:  Young male with history of hypertension,

hyperlipemia, admitted for acute pancreatitis, acute hepatitis, alcohol

related possibly, hypokalemia with persistent pain in the epigastric region

slowly improving parameters.  We will continue with IV fluids, current

blood pressure medication, will switch Dilaudid to Percocet.







__________________________________________

Zully Torres MD





DD:  06/07/2018 21:45:28

DT:  06/07/2018 22:43:26

[facility] # [id number]

## 2018-06-09 NOTE — DS
DISCHARGE DIAGNOSES:  Acute pancreatitis, probably alcohol related;

hepatitis, acute, possibly from alcohol; status post hypokalemia;

hypertension; hyperlipidemia.



HISTORY OF PRESENT ILLNESS:  Mr. Dixon is a 38-year-old male with

history of hypertension, hyperlipidemia, prior history of pancreatitis,

admitted for epigastric pain and tenderness 2 days prior to admission, and

he drank over the weekend prior to admission.  In the ED, the patient was

found to be having acute pancreatitis and the patient is being admitted for

further management.  Today, the patient is feeling much better.  Denies any

headache or dizziness.  Denies any chest pain, shortness of breath, or

wheezing.  Denies any nausea, vomiting, abdominal pain, diarrhea, or

constipation.  Denies any urinary complaints.  Denies any leg pains or leg

cramps.  All other systems reviewed and were found to be negative.



PHYSICAL EXAMINATION:

GENERAL:  Young male, lying in bed, in no acute distress.

VITAL SIGNS:  Blood pressure 126/80, pulse 82, respirations 20, temperature

98.2 degrees Fahrenheit, O2 saturation 100% on room air.

HEENT:  Pupils equal, round, and reacting to light and accommodation. 

Extraocular muscles intact.  No icterus.  No pallor.  No oral thrush.  No

pharyngeal congestion.

NECK:  Supple.  No JVD.

LUNGS:  Bilateral vesicular breath sounds.  No wheezing.  No rhonchi.

CARDIOVASCULAR:  S1 and S2 present, regular.

ABDOMEN:  Soft, nontender.  Bowel sounds present.  No guarding.  No

rigidity.  No rebound tenderness noted.

CENTRAL NERVOUS SYSTEM:  Alert, awake, oriented x3.  No focal deficits

noted.

EXTREMITIES:  No edema.  Palpable peripheral pulses.



LABORATORY DATA:  Labs from this morning:  Sodium 141, potassium 3.8,

chloride 99, bicarb 31, BUN 9, creatinine 1, glucose 93, calcium 9.7.  LFTs

within normal limits.  Amylase 134, lipase is 834.  WBC 7.2, hemoglobin

12.3, hematocrit 34.8, platelets 219.  TSH is 4.4.  Lipase on admission

3257.  Cholesterol 128, triglycerides 212, LDL 53, HDL 33.  On admission,

, .



IMAGING:  CT of the abdomen and pelvis consistent with acute pancreatitis.



HOSPITAL COURSE:  The patient was admitted to the hospital for acute

pancreatitis.  The patient was kept n.p.o., started on IV fluids.  Given

pain medication.  The patient was evaluated by GI.  Slowly, the patient was

started on liquid diet.  Yesterday, the patient was complaining of

abdominal pain associated with food.  So, repeat CT scan was ordered by GI.

The repeat CAT scan did not show any necrotic changes in the pancreas and

his laboratory parameters are coming down, and the patient is cleared by GI

for discharge.  His hospital course is uncomplicated by persistent

hypokalemia and requiring multiple doses of potassium supplementation.  His

potassium is stable this morning.  The patient is otherwise hemodynamically

stable and cleared by GI.  The patient is discharged home.



CONDITION UPON DISCHARGE:  The patient is alert, awake, and oriented x3 and

hemodynamically stable at the time of discharge.



DISCHARGE INSTRUCTIONS:  Follow up with PMD.  Follow up with GI.



DISCHARGE MEDICATIONS:  Protonix 40 mg p.o. daily; lisinopril 10 mg p.o.

b.i.d.; Percocet 1 tab p.o. every 6 hours p.r.n. 10 tablets given; Lipitor

10 mg p.o. daily.



DISCHARGE DIET:  Low-cholesterol, low-sodium diet.



ACTIVITY:  As tolerated.



I advised the patient to return to the ED if any worsening of the symptoms.





_________________________________________

Zully Torres MD





DD:  06/08/2018 21:42:45

DT:  06/08/2018 22:22:06

Job # [id number]